# Patient Record
Sex: FEMALE | Race: ASIAN | NOT HISPANIC OR LATINO
[De-identification: names, ages, dates, MRNs, and addresses within clinical notes are randomized per-mention and may not be internally consistent; named-entity substitution may affect disease eponyms.]

---

## 2018-04-23 PROBLEM — Z00.00 ENCOUNTER FOR PREVENTIVE HEALTH EXAMINATION: Status: ACTIVE | Noted: 2018-04-23

## 2018-04-25 ENCOUNTER — APPOINTMENT (OUTPATIENT)
Dept: HUMAN REPRODUCTION | Facility: CLINIC | Age: 38
End: 2018-04-25

## 2018-05-21 ENCOUNTER — APPOINTMENT (OUTPATIENT)
Dept: ORTHOPEDIC SURGERY | Facility: CLINIC | Age: 38
End: 2018-05-21
Payer: MEDICAID

## 2018-05-21 VITALS
HEIGHT: 64 IN | WEIGHT: 160 LBS | HEART RATE: 82 BPM | BODY MASS INDEX: 27.31 KG/M2 | SYSTOLIC BLOOD PRESSURE: 106 MMHG | DIASTOLIC BLOOD PRESSURE: 71 MMHG

## 2018-05-21 DIAGNOSIS — Z78.9 OTHER SPECIFIED HEALTH STATUS: ICD-10-CM

## 2018-05-21 DIAGNOSIS — Z56.0 UNEMPLOYMENT, UNSPECIFIED: ICD-10-CM

## 2018-05-21 DIAGNOSIS — M54.31 SCIATICA, RIGHT SIDE: ICD-10-CM

## 2018-05-21 DIAGNOSIS — M25.561 PAIN IN RIGHT KNEE: ICD-10-CM

## 2018-05-21 DIAGNOSIS — Z82.61 FAMILY HISTORY OF ARTHRITIS: ICD-10-CM

## 2018-05-21 PROCEDURE — 73502 X-RAY EXAM HIP UNI 2-3 VIEWS: CPT | Mod: RT

## 2018-05-21 PROCEDURE — 99204 OFFICE O/P NEW MOD 45 MIN: CPT

## 2018-05-21 PROCEDURE — 72100 X-RAY EXAM L-S SPINE 2/3 VWS: CPT

## 2018-05-21 PROCEDURE — 73564 X-RAY EXAM KNEE 4 OR MORE: CPT | Mod: RT

## 2018-05-21 RX ORDER — OMEPRAZOLE 20 MG/1
20 TABLET, DELAYED RELEASE ORAL
Qty: 30 | Refills: 0 | Status: ACTIVE | COMMUNITY
Start: 2018-01-19

## 2018-05-21 SDOH — ECONOMIC STABILITY - INCOME SECURITY: UNEMPLOYMENT, UNSPECIFIED: Z56.0

## 2018-08-07 ENCOUNTER — APPOINTMENT (OUTPATIENT)
Dept: NEUROLOGY | Facility: CLINIC | Age: 38
End: 2018-08-07
Payer: MEDICAID

## 2018-08-07 VITALS
HEIGHT: 64 IN | DIASTOLIC BLOOD PRESSURE: 85 MMHG | HEART RATE: 79 BPM | WEIGHT: 157 LBS | BODY MASS INDEX: 26.8 KG/M2 | SYSTOLIC BLOOD PRESSURE: 123 MMHG

## 2018-08-07 DIAGNOSIS — G40.909 EPILEPSY, UNSPECIFIED, NOT INTRACTABLE, W/OUT STATUS EPILEPTICUS: ICD-10-CM

## 2018-08-07 PROCEDURE — 99205 OFFICE O/P NEW HI 60 MIN: CPT

## 2018-08-16 ENCOUNTER — RX CHANGE (OUTPATIENT)
Age: 38
End: 2018-08-16

## 2018-08-16 ENCOUNTER — RX RENEWAL (OUTPATIENT)
Age: 38
End: 2018-08-16

## 2018-11-08 ENCOUNTER — APPOINTMENT (OUTPATIENT)
Dept: NEUROLOGY | Facility: CLINIC | Age: 38
End: 2018-11-08
Payer: MEDICAID

## 2018-11-08 VITALS
WEIGHT: 160 LBS | BODY MASS INDEX: 27.31 KG/M2 | HEART RATE: 91 BPM | HEIGHT: 64 IN | SYSTOLIC BLOOD PRESSURE: 110 MMHG | DIASTOLIC BLOOD PRESSURE: 78 MMHG

## 2018-11-08 DIAGNOSIS — R93.89 ABNORMAL FINDINGS ON DIAGNOSTIC IMAGING OF OTHER SPECIFIED BODY STRUCTURES: ICD-10-CM

## 2018-11-08 PROCEDURE — 99214 OFFICE O/P EST MOD 30 MIN: CPT

## 2018-11-08 RX ORDER — LEVETIRACETAM 500 MG/1
500 TABLET, FILM COATED ORAL
Qty: 180 | Refills: 0 | Status: DISCONTINUED | COMMUNITY
Start: 2018-03-25 | End: 2018-11-08

## 2018-11-08 RX ORDER — LEVETIRACETAM 500 MG/1
500 TABLET, FILM COATED ORAL
Refills: 0 | Status: DISCONTINUED | COMMUNITY
End: 2018-11-08

## 2018-11-09 ENCOUNTER — OTHER (OUTPATIENT)
Age: 38
End: 2018-11-09

## 2018-11-20 ENCOUNTER — APPOINTMENT (OUTPATIENT)
Dept: MRI IMAGING | Facility: CLINIC | Age: 38
End: 2018-11-20

## 2018-11-21 ENCOUNTER — OTHER (OUTPATIENT)
Age: 38
End: 2018-11-21

## 2018-12-07 ENCOUNTER — OUTPATIENT (OUTPATIENT)
Dept: OUTPATIENT SERVICES | Facility: HOSPITAL | Age: 38
LOS: 1 days | End: 2018-12-07
Payer: MEDICAID

## 2018-12-07 ENCOUNTER — APPOINTMENT (OUTPATIENT)
Dept: MRI IMAGING | Facility: CLINIC | Age: 38
End: 2018-12-07
Payer: MEDICAID

## 2018-12-07 DIAGNOSIS — Z00.8 ENCOUNTER FOR OTHER GENERAL EXAMINATION: ICD-10-CM

## 2018-12-07 PROCEDURE — 70553 MRI BRAIN STEM W/O & W/DYE: CPT | Mod: 26

## 2018-12-07 PROCEDURE — A9585: CPT

## 2018-12-07 PROCEDURE — 70553 MRI BRAIN STEM W/O & W/DYE: CPT

## 2018-12-17 ENCOUNTER — OTHER (OUTPATIENT)
Age: 38
End: 2018-12-17

## 2018-12-24 ENCOUNTER — MOBILE ON CALL (OUTPATIENT)
Age: 38
End: 2018-12-24

## 2019-01-24 ENCOUNTER — APPOINTMENT (OUTPATIENT)
Dept: NEUROSURGERY | Facility: CLINIC | Age: 39
End: 2019-01-24
Payer: MEDICAID

## 2019-01-24 VITALS
TEMPERATURE: 97.8 F | HEIGHT: 64 IN | WEIGHT: 160 LBS | BODY MASS INDEX: 27.31 KG/M2 | RESPIRATION RATE: 16 BRPM | SYSTOLIC BLOOD PRESSURE: 115 MMHG | DIASTOLIC BLOOD PRESSURE: 80 MMHG | HEART RATE: 84 BPM | OXYGEN SATURATION: 95 %

## 2019-01-24 PROCEDURE — 99204 OFFICE O/P NEW MOD 45 MIN: CPT

## 2019-01-24 NOTE — PHYSICAL EXAM
[General Appearance - Alert] : alert [General Appearance - In No Acute Distress] : in no acute distress [Oriented To Time, Place, And Person] : oriented to person, place, and time [Person] : oriented to person [Place] : oriented to place [Time] : oriented to time [Cranial Nerves Optic (II)] : visual acuity intact bilaterally,  pupils equal round and reactive to light [Cranial Nerves Oculomotor (III)] : extraocular motion intact [Cranial Nerves Trigeminal (V)] : facial sensation intact symmetrically [Cranial Nerves Facial (VII)] : face symmetrical [Cranial Nerves Vestibulocochlear (VIII)] : hearing was intact bilaterally [Cranial Nerves Glossopharyngeal (IX)] : tongue and palate midline [Cranial Nerves Accessory (XI - Cranial And Spinal)] : head turning and shoulder shrug symmetric [Cranial Nerves Hypoglossal (XII)] : there was no tongue deviation with protrusion [Motor Strength] : muscle strength was normal in all four extremities [Involuntary Movements] : no involuntary movements were seen [] : no respiratory distress [Exaggerated Use Of Accessory Muscles For Inspiration] : no accessory muscle use [Abnormal Walk] : normal gait

## 2019-01-24 NOTE — REASON FOR VISIT
[New Patient Visit] : a new patient visit [Referred By: _________] : Patient was referred by ZARI [Spouse] : spouse

## 2019-02-19 ENCOUNTER — APPOINTMENT (OUTPATIENT)
Dept: NEUROLOGY | Facility: CLINIC | Age: 39
End: 2019-02-19
Payer: MEDICAID

## 2019-02-19 VITALS
HEART RATE: 80 BPM | HEIGHT: 64 IN | SYSTOLIC BLOOD PRESSURE: 130 MMHG | DIASTOLIC BLOOD PRESSURE: 86 MMHG | BODY MASS INDEX: 27.83 KG/M2 | WEIGHT: 163 LBS

## 2019-02-19 PROCEDURE — 99214 OFFICE O/P EST MOD 30 MIN: CPT

## 2019-02-19 NOTE — ASSESSMENT
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 Per pt, MRA and CT angiogram negative. Had 2 breakthrough aura: one while taking LEV 1gm BID + LCM 50mg qHS and the other while taking LEV 1gm BID. Currently back on low doses of LEV and LCM duotherapy. Explained to pt and  that we have the options of high dose LEV monotherapy, LCM monotherapy, and low dose duotherapy. Since pt was previous sz free on LEV monotherapy, we decided to try LEV 2gm BID monotherapy. All questions and concerns answered and addressed in detail. Patient and her  verbalized understanding and agreed to plan.\par \par - following titration schedule provided to pt; will taper off LCM 50mg BID, and increase LEV 500mg BID to 2gm BID\par wk 1: /1000mg, LCM 50mg BID\par wk 2: LEV 1000/1000mg BID, LCM 50mg BID\par wk 3: LEV 1000/2000mg, LCM 50mg BID\par wk 4: LEV 2000/2000mg, LCM 50mg BID\par wk 5-6: LEV 2000/2000mg, LCM 50mg qHS\par wk 7 and onward: LEV 2000/2000mg, STOP LCM\par \par - cont CLZ ODT 0.5mg 1-2 tablet prn aura\par - cont folic acid 4mg daily (pt would like to have more children)\par - f/u Dr. Santo for cav mal's\par - pt not driving\par - f/u in 3 months\par \par \par All relevant epilepsy AAN quality care measures were addressed and discussed with the patient and her .\par \par More than 50% of time spent counseling and educating patient and her  about epilepsy specific safety issues including AED side effects and interactions, alcohol consumption, sleep deprivation, risks and driving privileges associated with the [facility] Guidelines (pt not driving), how treatment may affect contraction and pregnancy, death related to seizures/SUDEP, seizure 1st aid and risks. Patient is educated on seizure precautions, including no driving, no operating heavy machinery, no unsupervised swimming or bathing, no unsupervised use of stove, no climbing heights, and no unsupervised use of sharp objects.

## 2019-02-19 NOTE — HISTORY OF PRESENT ILLNESS
[FreeTextEntry1] : LAST CLINIC VISIT: 18\par \par INTERIM HISTORY:\par Pt today is accompanied by her . At the last visit, plan was to cross titrate LEV 500mg BID and LCM 50mg BID to LEV 1gm BID. On , pt was on LEV 1gm BID and LCM 50mg qHS when she had a typical aura. Pt took CLZ ODT. On , pt was only on LEV 1gm BID and again had another aura, but this time was very mild. She again took CLZ ODT and called the on-call epilepsy fellow, who instructed pt to go back to LEV 500mg BID and LCM 50mg BID. \par \par Pt also saw Dr. 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 Offered cerebral angiography, but pt said she had it done in [place]. She will be bringing the imaging to Dr. Santo once she obtains a copy. Will f/u Dr. Santo and repeat image in a yr.\par \par CURRENT AEDs:\par LEV 500mg BID - started \par LCM 50mg BID - started 2018\par CLZ ODT 0.5mg prn - started 2018\par \par \par PRIOR EPILEPSY HISTORY:\par 18: This is a 38 year-old RH [place] female with no significant PMH who is self referred to establish care for epilepsy. Pt today is accompanied by her . Pt was previously followed by neurologist Dr. Chuck Davis. Pt's  provided most of history since pt speaks limited English.\par \par Onset of sz at age 25, during the 3rd month of pregnancy. Pt was in [facility] at the time. She was not started on AED, nor was she started on AED after her second sz at 9th month of pregnancy. After delivery, pt was started on CBZ. She was sz free on CBZ approximately from [id number]. Doctor in [facility] tapered her off of CBZ. When pt unfortunately sustained another sz, she was put back on CBZ. \par \par [place] the [place] around . At the time, she was planning for second pregnancy, so CBZ was switched to LEV 500mg BID. Next seizure occurred on 2016, likely triggered by stress. LEV was increased 1gm BID. When pt was sz free for several years on this dosing, LEV was decreased back to 500mg BID. Unfortunately, with lowered dose of LEV, sz recurred on 18, while pt was in [place]. She was admitted to [facility]. Head imaging found innumerable lesions throughout R supratentorial hemisphere without focal enhancement, edema, or mass effect. Ddx: cav mal, infectious, less likely Sturge Nicolas or petechial hemorrhage. MRA H/N and cerebral angiogram were unremarkable. Pt and  were not aware of this finding. At discharge, LCM 50mg BID was added to LEV 500mg BID.\par \par === 18 ===\par Pt today is accompanied by her . Clarified that she is really just only LEV 500mg BID (not 1gm BID) and LCM 50mg BID. She was sz free for several yrs on LEV 1gm BID. Sz recurred after LEV was lowered to 500mg BID. Has not had any further sz since last visit. CURRENT AEDs: LEV 500mg BID - started ; LCM 50mg BID - started 2018; CLZ ODT 0.5mg prn - started 2018.\par \par \par SEIZURE DESCRIPTION AND TYPE:\par Type #1\par Severity: focal sz, both aware and impaired (unclear if progresses to true BL tonic clonic sz)\par Onset: 26yo\par Quality & associated signs/symptoms: Prolonged aura of nausea +/- vomiting, geographic shapes (eg. stars, bubbles) in her L visual field -> loss of awareness, head turning/jerking toward the L, tonic-clonic activity in L arm and leg -> postictal confusion, L Miguel Ángel's. Pt developed postictal psychosis (auditory hallucination, paranoia) once after a sz. \par Duration: 20-40s\par Timin-8 lifetime; last one 18 (very mild aura)\par Modifying factors: triggered by stress, sleep deprivation\par Diurnal Variation: none\par \par EPILEPSY TYPE: focal epilepsy, structural etiology\par HISTORY OF TONIC-CLONIC SZ: unclear\par HISTORY OF STATUS EPILEPTICUS: no\par \par SEIZURE RISK FACTORS:\par Innumerable brain lesions in the R supratentorial hemisphere of unclear etiology. Patient was a product of normal pregnancy and delivery. No history of febrile seizure, TBI, CNS infection, or FH of seizures.\par \par PREVIOUS AEDs:\par CBZ - started 26yo, stopped ~ because pt was planning for pregnancy\par \par IMAGING: \par CTH 7/15/18 ([place], [place]): Gyriform calcifications in the R temporooccipital regions. Multiple subcentimeter hyperdense lesions in the R cerebral hemisphere. No significant edema or midline shift. \par \par MRI w/wo 18 ([place], [place]): Innumerable lesions throughout R supratentorial hemisphere without evidence of edema or mass effect. Diffuse mild enhancement throughout R supratentorial hemisphere. 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 Does not know how to drive. No E/T/D. College graduate. \par

## 2019-02-19 NOTE — PHYSICAL EXAM
[FreeTextEntry1] : GENERAL PHYSICAL EXAM:\par GEN: well appearing, well nourished, no distress, normal affect, cooperative \par HEENT:  NCAT, OP clear\par EYES: sclera white, conjunctiva clear, no nystagmus\par NECK: supple\par CV: nl S1/S2, RRR, no murmur     		\par PULM: CTAB, no wheezing\par GI: soft ABD, +BS, NT, ND\par EXT: peripheral pulse intact, no edema, no clubbing, no cyanosis\par MSK: muscle tone and strength normal\par SKIN: warm, dry, no rash or lesion on exposed skin \par \par NEUROLOGICAL EXAM:\par Mental Status\par Orientation: alert and oriented to person, place, time, and situation \par Language: clear and fluent, intact comprehension and repetition, intact naming and reading\par \par Cranial Nerves\par II: full visual fields intact \par III, IV, VI: PERRL, EOMI\par V, VII: facial sensation and movement intact and symmetric \par VIII: hearing intact \par IX, X: uvula midline, soft palate elevates normally \par XI: BL shoulder shrug intact \par XII: tongue midline\par \par Motor\par Shoulder abd: 5+ (R), 5+ (L)\par EF/EE: 5+ (R), 5+ (L)\par WF/WE: 5+ (R), 5+ (L)\par hand : 5+ (R), 5+ (L)\par HF/HE: 5+ (R), 5+ (L)\par KF/KE: 5+ (R), 5+ (L)\par DF/PF: 5+ (R), 5+ (L)                \par Tone and bulk are normal in upper and lower limbs\par No pronator drift\par \par Sensation\par Intact to light touch, vibration, proprioception, pinprick, and temperature in all 4 EXTs\par \par Reflex\par 2+ in BL biceps, triceps, brachioradiali, patella, ankle                                    \par Plantar responses downward bilaterally\par \par Coordination\par Normal FTN, HTS \par BL HARLEY intact\par \par Gait\par Normal stance, stride, and pivot turn\par Tandem walk intact\par Negative Romberg\par \par

## 2019-02-21 NOTE — HISTORY OF PRESENT ILLNESS
[de-identified] : Becky Delvalle is a 39yr old female right handed here today for a new patient visit. She has a past medical history of seizures. Most recently she has had a seizure in August 2018. She has about 1 seizure per year. She is followed by neurologist Dr. Betancourt for seizure management and is currently on keppra 1g twice a day. She had a brain MRI done which was abnormal and Dr. Betancourt referred her to us for further management. Today she said she feels well. She denies any motor/speech/sensory/visual abnormalities.  \par \par PCP: Dr. Gayathri Delvalle

## 2019-02-21 NOTE — ASSESSMENT
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 \par MRI brain with and without contrast in 1yr then follow up in the office after\par Discussed potential for diagnostic cerebral angiography. (pt states she had angiogram done in [place] no images or reports with them today unsure of doctors name)\par Cavernous malformations most likely cause of her seizures \par Follow up with Dr. Betancourt for seizure management

## 2019-02-21 NOTE — RESULTS/DATA
[FreeTextEntry1] : EXAM: MR BRAIN WAW IC \par \par \par PROCEDURE DATE: 12/07/2018 \par \par \par \par INTERPRETATION: CLINICAL INFORMATION: Epilepsy protocol. [place] female \par presented with new onset seizures at age 25 during pregnancy. Started on \par anti-epileptics after completion of pregnancy and was eventually tapered off \par and restarted on antiepileptics with seizure recurrence. Per history, \par imaging at outside hospital found innumerable lesions throughout R \par supratentorial hemisphere without focal enhancement, edema, or mass effect. \par \par TECHNIQUE: MRI of the brain was performed with and without contrast. \par Sagittal and axial T1, axial T2, FLAIR, SWI, diffusion-weighted images and \par an ADC map were obtained. \par \par 7.5 cc of Gadavist was injected, with 0 cc discarded. \par \par COMPARISON: None \par \par FINDINGS: \par There is a multitude of centrally T2 hyperintense, peripherally T2 \par hypointense signal mass throughout the right cerebral hemisphere. There are \par associated multiple serpiginous vessels concerning for cavernomatosis. There \par is no associated mass effect or surrounding parenchymal edema for any of \par these lesions to suggest recent hemorrhage. \par \par There is right hemispheric volume loss best appreciated on coronal T2 \par imaging, seen as asymmetric prominence of right hemispheric extra-axial \par spaces and sulci. \par \par There is an additional small focus of hypoattenuation on T2-weighted imaging \par in the left middle cranial fossa consistent with a small osteoma versus tiny \par calcified meningioma. \par \par There is no intraparenchymal hematoma, mass effect or midline shift. \par \par No abnormal extra-axial fluid collections are present. Major flow-voids at \par the base of the brain follow expected course and contour. \par \par The calvarium is intact. The visualized intraorbital compartments, paranasal \par sinuses and tympanomastoid cavities appear free of acute disease. 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 \par Lesions are unusually hemispheric as no lesions are seen within the left \par cerebral hemisphere. While findings may represent familial versus sporadic \par cavernomatosis, radiation induced cavernomatosis in the appropriate clinical \par setting may be considered. Other differential diagnoses of vascular \par malformations are not excluded. Findings are not consistent with \par neurosarcoidosis or lymphoproliferative abnormality. \par \par \par \par \par \par \par KAYCEE BERRIOS M.D., RADIOLOGY RESIDENT \par This document has been electronically signed. \par DAVID SANCHEZ M.D., ATTENDING RADIOLOGIST \par This document has been electronically signed. Dec 7 2018 4:38PM \par \par \par

## 2019-02-21 NOTE — CONSULT LETTER
[Dear  ___] : Dear  [unfilled], [Consult Letter:] : I had the pleasure of evaluating your patient, [unfilled]. [DrSushma  ___] : Dr. HAMEED [Consult Closing:] : Thank you very much for allowing me to participate in the care of this patient.  If you have any questions, please do not hesitate to contact me. [Sincerely,] : Sincerely, [FreeTextEntry1] : As you know she is a 39yr old right handed female with a past medical history significant for seizures.  Most recently she has had a seizure in August 2018. She has about 1 seizure per year. Her current antiseizure medication is keppra 1g twice a day. Recently, she had a brain MRI done which was abnormal.  After reviewing the MRI brain it is clear she has multiple cavernous malformations on the right hemisphere with developmental venous anomalies.  There is no acute hemorrhage seen.  The patient states she had a cerebral angiogram done in [place] in the past.  Those images and reports are not available for my review today. At this time I recommend repeat MRI brain with and without contrast in one year, mail us the cerebral angiogram, and follow up with neurology for continued seizure management.   [FreeTextEntry3] : Quirino Santo MD\par Professor of Neurological Surgery and Radiology\par  Department of Neurosurgery\par Director Cerebrovascular Surgery\par [facility] [employer] [facility]\par

## 2019-02-25 ENCOUNTER — RX RENEWAL (OUTPATIENT)
Age: 39
End: 2019-02-25

## 2019-04-03 ENCOUNTER — RX RENEWAL (OUTPATIENT)
Age: 39
End: 2019-04-03

## 2019-04-04 ENCOUNTER — OTHER (OUTPATIENT)
Age: 39
End: 2019-04-04

## 2019-04-04 ENCOUNTER — RX RENEWAL (OUTPATIENT)
Age: 39
End: 2019-04-04

## 2019-05-05 ENCOUNTER — RX RENEWAL (OUTPATIENT)
Age: 39
End: 2019-05-05

## 2019-06-03 ENCOUNTER — APPOINTMENT (OUTPATIENT)
Dept: NEUROLOGY | Facility: CLINIC | Age: 39
End: 2019-06-03
Payer: MEDICAID

## 2019-06-03 VITALS
HEART RATE: 84 BPM | WEIGHT: 165 LBS | DIASTOLIC BLOOD PRESSURE: 81 MMHG | SYSTOLIC BLOOD PRESSURE: 116 MMHG | HEIGHT: 64 IN | BODY MASS INDEX: 28.17 KG/M2

## 2019-06-03 PROCEDURE — 99214 OFFICE O/P EST MOD 30 MIN: CPT

## 2019-06-03 NOTE — HISTORY OF PRESENT ILLNESS
[FreeTextEntry1] : LAST CLINIC VISIT: 2/9/19\par \par INTERIM HISTORY:\par Pt today is accompanied by her . At the last visit, planned to taper off LCM 50mg BID and increase LEV from 500mg BID to 2000mg BID. Three to four days after coming off of LCM, pt had a breakthrough seizure, needed CZP ODT 1mg x2. Pt now on LCM 50mg BID and LEV 1000mg BID. No further sz on this duo-therapy. \par \par CURRENT AEDs:\par LEV 1000mg BID - started 2015\par LCM 50mg BID - started 7/2018\par CLZ ODT 1mg tabs prn - started 8/2018\par \par \par PRIOR EPILEPSY HISTORY:\par 8/7/18: This is a 38 year-old RH [place]ian female with no significant PMH who is self referred to establish care for epilepsy. Pt today is accompanied by her . Pt was previously followed by neurologist Dr. Chuck Davis. Pt's  provided most of history since pt speaks limited English.\par \par Onset of sz at age 25, during the 3rd month of pregnancy. Pt was in [facility] at the time. She was not started on AED, nor was she started on AED after her second sz at 9th month of pregnancy. After delivery, pt was started on CBZ. She was sz free on CBZ approximately from [id number]. Doctor in [facility] tapered her off of CBZ. When pt unfortunately sustained another sz, she was put back on CBZ. \par \par [place] the [place] around [place]. At the time, she was planning for second pregnancy, so CBZ was switched to LEV 500mg BID. Next seizure occurred on 8/2016, likely triggered by stress. LEV was increased 1gm BID. When pt was sz free for several years on this dosing, LEV was decreased back to 500mg BID. Unfortunately, with lowered dose of LEV, sz recurred on 7/14/18, while pt was in [place]. She was admitted to [facility]. Head imaging found innumerable lesions throughout R supratentorial hemisphere without focal enhancement, edema, or mass effect. Ddx: cav mal, infectious, less likely Sturge Nicolas or petechial hemorrhage. MRA H/N and cerebral angiogram were unremarkable. Pt and  were not aware of this finding. At discharge, LCM 50mg BID was added to LEV 500mg BID.\par \par === 11/8/18 ===\par Pt today is accompanied by her . Clarified that she is really just only LEV 500mg BID (not 1gm BID) and LCM 50mg BID. She was sz free for several yrs on LEV 1gm BID. Sz recurred after LEV was lowered to 500mg BID. Has not had any further sz since last visit. CURRENT AEDs: LEV 500mg BID - started 2015; LCM 50mg BID - started 7/2018; CLZ ODT 0.5mg prn - started 8/2018.\par \par === 2/19/19 ===\par Pt today is accompanied by her . At the last visit, plan was to cross titrate LEV 500mg BID and LCM 50mg BID to LEV 1gm BID. On 11/13, pt was on LEV 1gm BID and LCM 50mg qHS when she had a typical aura. Pt took CLZ ODT. On 12/24, pt was only on LEV 1gm BID and again had another aura, but this time was very mild. She again took CLZ ODT and called the on-call epilepsy fellow, who instructed pt to go back to LEV 500mg BID and LCM 50mg BID. \par \par Pt also saw Dr. 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 Offered cerebral angiography, but pt said she had it done in [place]. She will be bringing the imaging to Dr. Santo once she obtains a copy. Will f/u Dr. Santo and repeat image in a yr. CURRENT AEDs: LEV 500mg BID - started 2015; LCM 50mg BID - started 7/2018; CLZ ODT 0.5mg prn - started 8/2018.\par \par \par SEIZURE DESCRIPTION AND TYPE:\par Type #1\par Severity: focal sz, both aware and impaired (unclear if progresses to true BL tonic clonic sz)\par Onset: 26yo\par Quality & associated signs/symptoms: Prolonged aura of nausea +/- vomiting, geographic shapes (eg. stars, bubbles) in her L visual field -> loss of awareness, head turning/jerking toward the L, tonic-clonic activity in L arm and leg -> postictal confusion, L Miguel Ángel's. Pt developed postictal psychosis (auditory hallucination, paranoia) once after a sz. \par Duration: 20-40s\par Timing: ~10 lifetime; last one 4/2019 \par Modifying factors: triggered by stress, sleep deprivation\par Diurnal Variation: none\par \par EPILEPSY TYPE: focal epilepsy, structural etiology\par HISTORY OF TONIC-CLONIC SZ: unclear\par HISTORY OF STATUS EPILEPTICUS: no\par \par SEIZURE RISK FACTORS:\par Innumerable R hemispheric brain lesions thought to be cav mal's and DVAs w/o acute bleeding. Patient was a product of normal pregnancy and delivery. 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 Lesions are unusually hemispheric as no lesions are seen within the left cerebral hemisphere. While findings may represent familial versus sporadic cavernomatosis, radiation induced cavernomatosis in the appropriate clinical setting may be considered. Other differential diagnoses of vascular malformations are not excluded. Findings are not consistent with neurosarcoidosis or lymphoproliferative abnormality. \par \par CTH 7/15/18 ([place], [place]): Gyriform calcifications in the R temporooccipital regions. Multiple subcentimeter hyperdense lesions in the R cerebral hemisphere. No significant edema or midline shift. \par \par MRI w/wo 7/14/18 ([place], [place]): Innumerable lesions throughout R supratentorial hemisphere without evidence of edema or mass effect. Diffuse mild enhancement throughout R supratentorial hemisphere. Ddx: cav mal, infectious, less likely Sturge Nicolas or petechial hemorrhage.\par \par MRA H/N w/wo 7/15/18 ([place], [place]): normal\par \par Cerebral angiogram 7/17/18 ([place], [place]): no aneurysm or AVM \par \par NEUROPHYSIOLOGY:\par cvEEG 7/16 - 7/17/18 ([place], [place]): normal awake and asleep, no IED\par \par NEUROPSYCHOLOGY: \par none

## 2019-06-03 NOTE — ASSESSMENT
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 Per pt, MRA and CT angiogram negative. \par \par Had breakthrough aura while tapering off LCM. Currently back on LEV and LCM duo-therapy. No sz. Gave pt and  choices of either staying on duo-therapy (LCM 50mg BID, LEV 1gm BID) vs mono-therapy (LCM 100mg BID). Educated pt on reduced effect of teratogenicity on one AED compared to two. Pt prefers to stay on duo-therapy because she is afraid of further seizures. All questions and concerns answered and addressed in detail to pt and 's complete satisfaction. Patient and her  verbalized understanding and agreed to plan.\par \par - continue LCM 50mg BID, LEV 1gm BID (if AED escalation needed in future, increase LCM to 100mg BID)\par - cont CLZ ODT 1mg prn aura\par - cont folic acid 4mg daily (actively trying to conceive)\par - f/u Dr. Santo for cav mal's\par - pt not driving\par - f/u in 3 months\par \par \par All relevant epilepsy AAN quality care measures were addressed and discussed with the patient and her .\par \par More than 50% of time spent counseling and educating patient and her  about epilepsy specific safety issues including AED side effects and interactions, alcohol consumption, sleep deprivation, risks and driving privileges associated with the [place] Guidelines (pt not driving), how treatment may affect contraction and pregnancy, death related to seizures/SUDEP, seizure 1st aid and risks. Patient and  are educated on seizure precautions, including no driving, no operating heavy machinery, no unsupervised swimming or bathing, no unsupervised use of stove, no climbing heights, and no unsupervised use of sharp objects.

## 2019-08-13 ENCOUNTER — APPOINTMENT (OUTPATIENT)
Dept: DERMATOLOGY | Facility: CLINIC | Age: 39
End: 2019-08-13
Payer: MEDICAID

## 2019-08-13 VITALS — HEIGHT: 64 IN | BODY MASS INDEX: 28.51 KG/M2 | WEIGHT: 167 LBS

## 2019-08-13 DIAGNOSIS — Z91.89 OTHER SPECIFIED PERSONAL RISK FACTORS, NOT ELSEWHERE CLASSIFIED: ICD-10-CM

## 2019-08-13 DIAGNOSIS — L81.1 CHLOASMA: ICD-10-CM

## 2019-08-13 PROCEDURE — 99203 OFFICE O/P NEW LOW 30 MIN: CPT

## 2019-09-09 ENCOUNTER — APPOINTMENT (OUTPATIENT)
Dept: NEUROLOGY | Facility: CLINIC | Age: 39
End: 2019-09-09

## 2019-09-10 ENCOUNTER — APPOINTMENT (OUTPATIENT)
Dept: NEUROLOGY | Facility: CLINIC | Age: 39
End: 2019-09-10

## 2019-09-11 ENCOUNTER — APPOINTMENT (OUTPATIENT)
Dept: NEUROLOGY | Facility: CLINIC | Age: 39
End: 2019-09-11
Payer: MEDICAID

## 2019-09-11 PROCEDURE — 99214 OFFICE O/P EST MOD 30 MIN: CPT

## 2019-09-11 RX ORDER — LEVETIRACETAM 1000 MG/1
1000 TABLET, FILM COATED ORAL TWICE DAILY
Qty: 180 | Refills: 3 | Status: COMPLETED | COMMUNITY
Start: 2018-11-08 | End: 2019-09-11

## 2019-09-11 NOTE — ASSESSMENT
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 Per pt, MRA and CT angiogram negative. \par \par Still having seizures on LCM and LEV. Will increase LCM and switch LEV IR to ER. All questions and concerns answered and addressed in detail to pt and 's complete satisfaction. Patient and her  verbalized understanding and agreed to plan.\par \par - increaseLCM 50mg BID to 100mg BID: 50/100mg x1wk -> 100mg BID\par - switch LEV IR 1gm BID to ER 1gm BID\par - cont CLZ ODT 1mg prn aura\par - cont folic acid 4mg daily (actively trying to conceive)\par - f/u Dr. Santo for cav mal's\par - pt not driving\par - f/u in 1 month\par \par \par All relevant epilepsy AAN quality care measures were addressed and discussed with the patient and her .\par \par More than 50% of time spent counseling and educating patient and her  about epilepsy specific safety issues including AED side effects and interactions, alcohol consumption, sleep deprivation, risks and driving privileges associated with the [place] Guidelines (pt not driving), how treatment may affect contraction and pregnancy, death related to seizures/SUDEP, seizure 1st aid and risks. Patient and  are educated on seizure precautions, including no driving, no operating heavy machinery, no unsupervised swimming or bathing, no unsupervised use of stove, no climbing heights, and no unsupervised use of sharp objects.

## 2019-09-11 NOTE — HISTORY OF PRESENT ILLNESS
[FreeTextEntry1] : LAST CLINIC VISIT: 2/9/19\par \par INTERIM HISTORY:\par Since last visit, pt has had 4 seizures that started with buzzing noise, visual aura in L eye, nausea and tachycardia. All of which were aborted with CZP ODT.\par \par CURRENT AEDs:\par LEV 1000mg BID - started 2015\par LCM 50mg BID - started 7/2018\par CLZ ODT 1mg tabs prn - started 8/2018\par \par \par PRIOR EPILEPSY HISTORY:\par 8/7/18: This is a 38 year-old RH Ukrainian female with no significant PMH who is self referred to establish care for epilepsy. Pt today is accompanied by her . Pt was previously followed by neurologist Dr. Chuck Davis. Pt's  provided most of history since pt speaks limited English.\par \par Onset of sz at age 25, during the 3rd month of pregnancy. Pt was in [facility] at the time. She was not started on AED, nor was she started on AED after her second sz at 9th month of pregnancy. After delivery, pt was started on CBZ. She was sz free on CBZ approximately from [id number]. Doctor in [facility] tapered her off of CBZ. When pt unfortunately sustained another sz, she was put back on CBZ. \par \par [place] to the [place] around [place]. At the time, she was planning for second pregnancy, so CBZ was switched to LEV 500mg BID. Next seizure occurred on 8/2016, likely triggered by stress. LEV was increased 1gm BID. When pt was sz free for several years on this dosing, LEV was decreased back to 500mg BID. Unfortunately, with lowered dose of LEV, sz recurred on 7/14/18, while pt was in [place]. She was admitted to [facility]. Head imaging found innumerable lesions throughout R supratentorial hemisphere without focal enhancement, edema, or mass effect. Ddx: cav mal, infectious, less likely Sturge Nicolas or petechial hemorrhage. MRA H/N and cerebral angiogram were unremarkable. Pt and  were not aware of this finding. At discharge, LCM 50mg BID was added to LEV 500mg BID.\par \par === 11/8/18 ===\par Pt today is accompanied by her . Clarified that she is really just only LEV 500mg BID (not 1gm BID) and LCM 50mg BID. She was sz free for several yrs on LEV 1gm BID. Sz recurred after LEV was lowered to 500mg BID. Has not had any further sz since last visit. CURRENT AEDs: LEV 500mg BID - started 2015; LCM 50mg BID - started 7/2018; CLZ ODT 0.5mg prn - started 8/2018.\par \par === 2/19/19 ===\par Pt today is accompanied by her . At the last visit, plan was to cross titrate LEV 500mg BID and LCM 50mg BID to LEV 1gm BID. On 11/13, pt was on LEV 1gm BID and LCM 50mg qHS when she had a typical aura. Pt took CLZ ODT. On 12/24, pt was only on LEV 1gm BID and again had another aura, but this time was very mild. She again took CLZ ODT and called the on-call epilepsy fellow, who instructed pt to go back to LEV 500mg BID and LCM 50mg BID. \par \par Pt also saw Dr. 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 Offered cerebral angiography, but pt said she had it done in [place]. She will be bringing the imaging to Dr. Santo once she obtains a copy. Will f/u Dr. Santo and repeat image in a yr. CURRENT AEDs: LEV 500mg BID - started 2015; LCM 50mg BID - started 7/2018; CLZ ODT 0.5mg prn - started 8/2018.\par \par === 6/3/19 ===\par Pt today is accompanied by her . At the last visit, planned to taper off LCM 50mg BID and increase LEV from 500mg BID to 2000mg BID. Three to four days after coming off of LCM, pt had a breakthrough seizure, needed CZP ODT 1mg x2. Pt now on LCM 50mg BID and LEV 1000mg BID. No further sz on this duo-therapy. CURRENT AEDs: LEV 1000mg BID - started 2015, LCM 50mg BID - started 7/2018, CLZ ODT 1mg tabs prn - started 8/2018.\par \par \par SEIZURE DESCRIPTION AND TYPE:\par Type #1\par Severity: focal sz, both aware and impaired (unclear if progresses to true BL tonic clonic sz)\par Onset: 26yo\par Quality & associated signs/symptoms: Prolonged aura of nausea +/- vomiting, geographic shapes (eg. stars, bubbles) in her L visual field -> loss of awareness, head turning/jerking toward the L, tonic-clonic activity in L arm and leg -> postictal confusion, L Miguel Ángel's. Pt developed postictal psychosis (auditory hallucination, paranoia) once after a sz. \par Duration: 20-40s\par Timing: rare before 7/2018, frequency increased to now up to once every 1-2 months\par Modifying factors: triggered by stress, sleep deprivation\par Diurnal Variation: none\par \par EPILEPSY TYPE: focal epilepsy, structural etiology\par HISTORY OF TONIC-CLONIC SZ: unclear\par HISTORY OF STATUS EPILEPTICUS: no\par \par SEIZURE RISK FACTORS:\par Innumerable R hemispheric brain lesions thought to be cav mal's and DVAs w/o acute bleeding. Patient was a product of normal pregnancy and delivery. 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 Lesions are unusually hemispheric as no lesions are seen within the left cerebral hemisphere. While findings may represent familial versus sporadic cavernomatosis, radiation induced cavernomatosis in the appropriate clinical setting may be considered. Other differential diagnoses of vascular malformations are not excluded. Findings are not consistent with neurosarcoidosis or lymphoproliferative abnormality. \par \par CTH 7/15/18 ([place], [place]): Gyriform calcifications in the R temporooccipital regions. Multiple subcentimeter hyperdense lesions in the R cerebral hemisphere. No significant edema or midline shift. \par \par MRI w/wo 7/14/18 ([place], [place]): Innumerable lesions throughout R supratentorial hemisphere without evidence of edema or mass effect. Diffuse mild enhancement throughout R supratentorial hemisphere. Ddx: cav mal, infectious, less likely Sturge Nicolas or petechial hemorrhage.\par \par MRA H/N w/wo 7/15/18 ([place], [place]): normal\par \par Cerebral angiogram 7/17/18 ([place], [place]): no aneurysm or AVM \par \par NEUROPHYSIOLOGY:\par cvEEG 7/16 - 7/17/18 ([place], [place]): normal awake and asleep, no IED\par \par NEUROPSYCHOLOGY: \par none

## 2019-09-19 ENCOUNTER — RX RENEWAL (OUTPATIENT)
Age: 39
End: 2019-09-19

## 2019-09-30 ENCOUNTER — APPOINTMENT (OUTPATIENT)
Dept: NEUROSURGERY | Facility: CLINIC | Age: 39
End: 2019-09-30
Payer: MEDICAID

## 2019-09-30 PROCEDURE — 99213 OFFICE O/P EST LOW 20 MIN: CPT

## 2019-09-30 NOTE — REASON FOR VISIT
[Follow-Up: _____] : a [unfilled] follow-up visit [Spouse] : spouse [FreeTextEntry1] : Becky Delvalle is a 39yr old female right handed here today for a follow up visit. We are monitoring cavernous malformations that she has in the right hemisphere. She did not get any new imaging. She brought the angiogram disc and report that was done in [place] for us to review. In the past couple of months she has had two seizures. Her seizure are being managed by Dr. Betancourt. Today she feels well, denies any headache, motor, sensory, speech, visual abnormalities

## 2019-09-30 NOTE — PHYSICAL EXAM
[General Appearance - In No Acute Distress] : in no acute distress [Person] : oriented to person [General Appearance - Alert] : alert [Place] : oriented to place [Time] : oriented to time [Motor Strength] : muscle strength was normal in all four extremities [Involuntary Movements] : no involuntary movements were seen [] : no respiratory distress [Abnormal Walk] : normal gait

## 2019-09-30 NOTE — ASSESSMENT
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  The reports read as normal cerebral angiogram without any evidence of aneurysms or arterial venous malformations. Unfortunately the discs only show the images of the left vertebral artery. The other vessels imaged are not on these discs. \par MRI brain with and without contrast in December 2019 then follow up in our office after. \par Follow up with Dr. Betancourt in regards to the seizure management. \par Advised them to bring us new cerebral angiogram discs if they can.  \par

## 2019-11-06 ENCOUNTER — APPOINTMENT (OUTPATIENT)
Dept: NEUROLOGY | Facility: CLINIC | Age: 39
End: 2019-11-06
Payer: MEDICAID

## 2019-11-06 VITALS
SYSTOLIC BLOOD PRESSURE: 118 MMHG | BODY MASS INDEX: 36.57 KG/M2 | DIASTOLIC BLOOD PRESSURE: 82 MMHG | HEIGHT: 55 IN | WEIGHT: 158 LBS

## 2019-11-06 PROCEDURE — 99214 OFFICE O/P EST MOD 30 MIN: CPT

## 2019-11-06 RX ORDER — LEVETIRACETAM 1000 MG/1
1000 TABLET, FILM COATED ORAL TWICE DAILY
Qty: 180 | Refills: 3 | Status: COMPLETED | COMMUNITY
Start: 2019-09-11 | End: 2019-11-06

## 2019-11-06 NOTE — ASSESSMENT
[FreeTextEntry1] : JERRY BUTCHER is a 39 year-old RH female w/ numerous R hemisphere cav mal's (sees Dr. Santo) and DVAs who presented for followup for focal epilepsy of structural etiology, characterized by focal aware and impaired awareness sz's (unclear if progresses to true BL tonic clonic). Per pt, MRA and CT angiogram negative. \par \par Sz free on LEV and LCM duo-therapy. All questions and concerns answered and addressed in detail to pt and 's complete satisfaction. Patient and her  verbalized understanding and agreed to plan.\par \par - continue LCM 100mg BID\par - switch LEV IR 1gm BID to ER 1gm BID\par - cont CLZ ODT 1mg prn aura\par - cont folic acid 4mg daily (actively trying to conceive)\par - CBC, CMP, LEV and LCM trough levels\par - f/u Dr. Sanot for cav mal's\par - pt not driving\par - f/u in 2 months\par \par \par All relevant epilepsy AAN quality care measures were addressed and discussed with the patient and her .\par \par More than 50% of time spent counseling and educating patient and her  about epilepsy specific safety issues including AED side effects and interactions, alcohol consumption, sleep deprivation, risks and driving privileges associated with the [place] Guidelines (pt not driving), how treatment may affect contraction and pregnancy, death related to seizures/SUDEP, seizure 1st aid and risks. Patient and  are educated on seizure precautions, including no driving, no operating heavy machinery, no unsupervised swimming or bathing, no unsupervised use of stove, no climbing heights, and no unsupervised use of sharp objects.

## 2019-11-06 NOTE — HISTORY OF PRESENT ILLNESS
[FreeTextEntry1] : LAST CLINIC VISIT: 9/11/19\par \par INTERIM HISTORY:\par Pt today is accompanied by her . At the last visit, increased LCM from 50mg BID to 100mg BID, which pt did. She was asked to change LEV IR 1gm BID to ER 1gm BID, which pt did not do. Hasn't had any sz since last visit. Saw Dr. Santo; awaiting MRI brain w/wo.\par \par CURRENT AEDs:\par LEV IR 1000mg BID - started 2015\par LCM 100mg BID - started 7/2018\par CLZ ODT 1mg tabs prn - started 8/2018\par \par \par PRIOR EPILEPSY HISTORY:\par 8/7/18: This is a 38 year-old RH Polish female with no significant PMH who is self referred to establish care for epilepsy. Pt today is accompanied by her . Pt was previously followed by neurologist Dr. Chuck Davis. Pt's  provided most of history since pt speaks limited English.\par \par Onset of sz at age 25, during the 3rd month of pregnancy. Pt was in [facility] at the time. She was not started on AED, nor was she started on AED after her second sz at 9th month of pregnancy. After delivery, pt was started on CBZ. She was sz free on CBZ approximately from [id number]. Doctor in [facility] tapered her off of CBZ. When pt unfortunately sustained another sz, she was put back on CBZ. \par \par [place] around [place]. At the time, she was planning for second pregnancy, so CBZ was switched to LEV 500mg BID. Next seizure occurred on 8/2016, likely triggered by stress. LEV was increased 1gm BID. When pt was sz free for several years on this dosing, LEV was decreased back to 500mg BID. Unfortunately, with lowered dose of LEV, sz recurred on 7/14/18, while pt was in [place]. She was admitted to [facility]. Head imaging found innumerable lesions throughout R supratentorial hemisphere without focal enhancement, edema, or mass effect. Ddx: cav mal, infectious, less likely Sturge Nicolas or petechial hemorrhage. MRA H/N and cerebral angiogram were unremarkable. Pt and  were not aware of this finding. At discharge, LCM 50mg BID was added to LEV 500mg BID.\par \par === 11/8/18 ===\par Pt today is accompanied by her . Clarified that she is really just only LEV 500mg BID (not 1gm BID) and LCM 50mg BID. She was sz free for several yrs on LEV 1gm BID. Sz recurred after LEV was lowered to 500mg BID. Has not had any further sz since last visit. CURRENT AEDs: LEV 500mg BID - started 2015; LCM 50mg BID - started 7/2018; CLZ ODT 0.5mg prn - started 8/2018.\par \par === 2/19/19 ===\par Pt today is accompanied by her . At the last visit, plan was to cross titrate LEV 500mg BID and LCM 50mg BID to LEV 1gm BID. On 11/13, pt was on LEV 1gm BID and LCM 50mg qHS when she had a typical aura. Pt took CLZ ODT. On 12/24, pt was only on LEV 1gm BID and again had another aura, but this time was very mild. She again took CLZ ODT and called the on-call epilepsy fellow, who instructed pt to go back to LEV 500mg BID and LCM 50mg BID. \par \par Pt also saw Dr. 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 Offered cerebral angiography, but pt said she had it done in [place]. She will be bringing the imaging to Dr. Santo once she obtains a copy. Will f/u Dr. Santo and repeat image in a yr. CURRENT AEDs: LEV 500mg BID - started 2015; LCM 50mg BID - started 7/2018; CLZ ODT 0.5mg prn - started 8/2018.\par \par === 6/3/19 ===\par Pt today is accompanied by her . At the last visit, planned to taper off LCM 50mg BID and increase LEV from 500mg BID to 2000mg BID. Three to four days after coming off of LCM, pt had a breakthrough seizure, needed CZP ODT 1mg x2. Pt now on LCM 50mg BID and LEV 1000mg BID. No further sz on this duo-therapy. CURRENT AEDs: LEV 1000mg BID - started 2015, LCM 50mg BID - started 7/2018, CLZ ODT 1mg tabs prn - started 8/2018.\par \par === 9/11/19 ===\par Since last visit, pt has had 4 seizures that started with buzzing noise, visual aura in L eye, nausea and tachycardia. All of which were aborted with CZP ODT. CURRENT AEDs: LEV 1000mg BID - started 2015\par LCM 50mg BID - started 7/2018; CLZ ODT 1mg tabs prn - started 8/2018.\par \par \par SEIZURE DESCRIPTION AND TYPE:\par Type #1\par Severity: focal sz, both aware and impaired (unclear if progresses to true BL tonic clonic sz)\par Onset: 26yo\par Quality & associated signs/symptoms: Prolonged aura of nausea +/- vomiting, geographic shapes (eg. stars, bubbles) in her L visual field -> loss of awareness, head turning/jerking toward the L, tonic-clonic activity in L arm and leg -> postictal confusion, L Miguel Ángel's. Pt developed postictal psychosis (auditory hallucination, paranoia) once after a sz. \par Duration: 20-40s\par Timing: rare before 7/2018, frequency increased to now up to once every 1-2 months; none since 9/2019 while on LEV 1gm BID and increased LCM to 100mg BID\par Modifying factors: triggered by stress, sleep deprivation\par Diurnal Variation: none\par \par EPILEPSY TYPE: focal epilepsy, structural etiology\par HISTORY OF TONIC-CLONIC SZ: unclear\par HISTORY OF STATUS EPILEPTICUS: no\par \par SEIZURE RISK FACTORS:\par Innumerable R hemispheric brain lesions thought to be cav mal's and DVAs w/o acute bleeding. Patient was a product of normal pregnancy and delivery. No history of febrile seizure, TBI, CNS infection, or FH of seizures.\par \par PREVIOUS AEDs:\par CBZ - started 26yo, stopped ~2015 because pt was planning for pregnancy\par \par IMAGING: \par MRI w/wo 12/8/18 (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 Lesions are unusually hemispheric as no lesions are seen within the left cerebral hemisphere. While findings may represent familial versus sporadic cavernomatosis, radiation induced cavernomatosis in the appropriate clinical setting may be considered. Other differential diagnoses of vascular malformations are not excluded. Findings are not consistent with neurosarcoidosis or lymphoproliferative abnormality. \par \par CTH 7/15/18 ([place], [place]): Gyriform calcifications in the R temporooccipital regions. Multiple subcentimeter hyperdense lesions in the R cerebral hemisphere. No significant edema or midline shift. \par \par MRI w/wo 7/14/18 ([place], [place]): Innumerable lesions throughout R supratentorial hemisphere without evidence of edema or mass effect. Diffuse mild enhancement throughout R supratentorial hemisphere. Ddx: cav mal, infectious, less likely Sturge Nicolas or petechial hemorrhage.\par \par MRA H/N w/wo 7/15/18 ([facility], [place]): normal\par \par Cerebral angiogram 7/17/18 ([place], [place]): no aneurysm or AVM \par \par NEUROPHYSIOLOGY:\par cvEEG 7/16 - 7/17/18 ([place], [place]): normal awake and asleep, no IED\par \par NEUROPSYCHOLOGY: \par none

## 2019-11-22 ENCOUNTER — APPOINTMENT (OUTPATIENT)
Dept: DERMATOLOGY | Facility: CLINIC | Age: 39
End: 2019-11-22

## 2019-11-27 ENCOUNTER — APPOINTMENT (OUTPATIENT)
Dept: MRI IMAGING | Facility: CLINIC | Age: 39
End: 2019-11-27
Payer: MEDICAID

## 2019-11-27 ENCOUNTER — OUTPATIENT (OUTPATIENT)
Dept: OUTPATIENT SERVICES | Facility: HOSPITAL | Age: 39
LOS: 1 days | End: 2019-11-27
Payer: MEDICAID

## 2019-11-27 DIAGNOSIS — Q28.3 OTHER MALFORMATIONS OF CEREBRAL VESSELS: ICD-10-CM

## 2019-11-27 PROCEDURE — 70553 MRI BRAIN STEM W/O & W/DYE: CPT | Mod: 26

## 2019-11-27 PROCEDURE — 70553 MRI BRAIN STEM W/O & W/DYE: CPT

## 2019-11-27 PROCEDURE — A9585: CPT

## 2019-12-18 ENCOUNTER — OFFICE (OUTPATIENT)
Dept: URBAN - METROPOLITAN AREA CLINIC 6 | Facility: CLINIC | Age: 39
Setting detail: OPHTHALMOLOGY
End: 2019-12-18
Payer: COMMERCIAL

## 2019-12-18 DIAGNOSIS — H52.7: ICD-10-CM

## 2019-12-18 DIAGNOSIS — H43.393: ICD-10-CM

## 2019-12-18 PROCEDURE — 92015 DETERMINE REFRACTIVE STATE: CPT | Performed by: OPHTHALMOLOGY

## 2019-12-18 PROCEDURE — 92004 COMPRE OPH EXAM NEW PT 1/>: CPT | Performed by: OPHTHALMOLOGY

## 2019-12-18 ASSESSMENT — REFRACTION_AUTOREFRACTION
OS_SPHERE: -0.50
OD_CYLINDER: +0.50
OD_AXIS: 173
OS_CYLINDER: +0.75
OD_SPHERE: -0.75
OS_AXIS: 010

## 2019-12-18 ASSESSMENT — AXIALLENGTH_DERIVED
OS_AL: 24.2101
OD_AL: 24.4111
OS_AL: 24.159
OD_AL: 24.4633
OD_AL: 24.4633
OS_AL: 24.2101

## 2019-12-18 ASSESSMENT — REFRACTION_MANIFEST
OU_VA: 20/
OS_VA2: 20/
OD_CYLINDER: +0.25
OD_VA1: 20/20
OS_AXIS: 010
OD_CYLINDER: +0.25
OD_VA1: 20/20
OD_SPHERE: -0.75
OS_AXIS: 010
OD_VA2: 20/
OS_CYLINDER: +0.50
OS_VA2: 20/
OS_SPHERE: -0.50
OD_VA3: 20/
OS_VA3: 20/
OD_SPHERE: -0.75
OS_VA1: 20/25
OS_VA1: 20/25
OU_VA: 20/
OS_VA3: 20/
OD_AXIS: 175
OD_VA3: 20/
OS_CYLINDER: +0.50
OD_ADD: +1.50
OS_SPHERE: -0.50
OD_AXIS: 175
OD_VA2: 20/
OS_ADD: +1.50

## 2019-12-18 ASSESSMENT — KERATOMETRY
OS_AXISANGLE_DEGREES: 167
OS_K1POWER_DIOPTERS: 42.00
OD_AXISANGLE_DEGREES: 088
OD_K1POWER_DIOPTERS: 41.75
OS_K2POWER_DIOPTERS: 42.25
OD_K2POWER_DIOPTERS: 42.00

## 2019-12-18 ASSESSMENT — CONFRONTATIONAL VISUAL FIELD TEST (CVF)
OD_FINDINGS: FULL
OS_FINDINGS: FULL

## 2019-12-18 ASSESSMENT — SPHEQUIV_DERIVED
OD_SPHEQUIV: -0.625
OD_SPHEQUIV: -0.625
OS_SPHEQUIV: -0.25
OS_SPHEQUIV: -0.25
OD_SPHEQUIV: -0.5
OS_SPHEQUIV: -0.125

## 2019-12-18 ASSESSMENT — VISUAL ACUITY
OD_BCVA: 20/25-2
OS_BCVA: 20/30

## 2019-12-23 ENCOUNTER — APPOINTMENT (OUTPATIENT)
Dept: NEUROSURGERY | Facility: CLINIC | Age: 39
End: 2019-12-23
Payer: MEDICAID

## 2019-12-23 VITALS
HEART RATE: 79 BPM | OXYGEN SATURATION: 98 % | BODY MASS INDEX: 30.73 KG/M2 | RESPIRATION RATE: 16 BRPM | SYSTOLIC BLOOD PRESSURE: 119 MMHG | DIASTOLIC BLOOD PRESSURE: 82 MMHG | WEIGHT: 167 LBS | TEMPERATURE: 98.3 F | HEIGHT: 62 IN

## 2019-12-23 PROCEDURE — 99213 OFFICE O/P EST LOW 20 MIN: CPT

## 2019-12-26 NOTE — ASSESSMENT
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 \par Patient denies any recent severe headaches. \par Discussed with patient and spouse should her seizures worsen, become unmanageable with medication or effect the quality of her life surgery can be an option for her. \par Epilepsy team would localize the are of seizure foci and should it be coming form a specific cavernous malformation we would potentially be able to surgically resect that cavernous malformation. \par If they do not wish to proceed with that option we will repeat MRI brain with and without contrast in one year.

## 2019-12-26 NOTE — PHYSICAL EXAM
[General Appearance - Alert] : alert [General Appearance - In No Acute Distress] : in no acute distress [Person] : oriented to person [Place] : oriented to place [Motor Strength] : muscle strength was normal in all four extremities [Time] : oriented to time [Involuntary Movements] : no involuntary movements were seen [] : no respiratory distress [Abnormal Walk] : normal gait

## 2019-12-26 NOTE — REASON FOR VISIT
[Follow-Up: _____] : a [unfilled] follow-up visit [Spouse] : spouse [FreeTextEntry3] : gianluca [FreeTextEntry1] : Becky Delvalle is a 39yr old female right handed here today for a follow up visit after having recent MRI brain completed. We are monitoring cavernous malformations that she has in the right hemisphere. Her most recent seizure was over a year ago. Today she feels well  has no complaints. \par

## 2019-12-26 NOTE — RESULTS/DATA
[FreeTextEntry1] : EXAM: MR BRAIN WAW IC \par \par \par PROCEDURE DATE: 11/27/2019 \par \par \par \par INTERPRETATION: \par CLINICAL INDICATION: Cavernous angioma. A new right 11/26/2019 320 \par Abnormality \par \par \par Magnetic resonance imaging of the brain was carried out with transaxial \par SPGR, FLAIR, fast spin echo T2 weighted images, axial susceptibility \par weighted series, diffusion weighted series and sagittal T1 weighted series \par on a 3.0 Evie magnet. Post contrast axial, coronal and sagittal T1 weighted \par images were obtained. 7 cc of Gadavist were intravenously injected, 0.5 cc \par were discarded. \par \par Comparison is made with the prior MRI of 12/7/2018. \par \par \par There is no significant interval change since the prior examination. The \par right cerebral hemisphere is slightly smaller than the left and there are \par innumerable foci of hemosiderin deposition throughout the brain parenchyma. \par These consist of small areas of hemosiderin deposition as well as prominent \par signal voids suggesting the presence of multiple veins this is suggestive of \par sporadic or familial multiple cavernoma syndrome. \par \par There is no evidence of acute hemorrhage or edema to suggest acute \par hemorrhage since the prior examination. There is no mass effect. The \par ventricles and sulci are normal in size and position. After contrast \par administration there is mild linear enhancement in the right subcortical \par white matter which has the appearance of multiple enhancing developmental \par venous anomalies. \par \par IMPRESSION: Right cerebral hemisphere slightly smaller than the left. No \par change since 12/7/2018. 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 \par \par \par \par \par \par \par \par \par \par KAYCEE SAXENA M.D., ATTENDING RADIOLOGIST \par This document has been electronically signed. Nov 27 2019 4:03PM

## 2020-01-08 ENCOUNTER — APPOINTMENT (OUTPATIENT)
Dept: NEUROLOGY | Facility: CLINIC | Age: 40
End: 2020-01-08
Payer: MEDICAID

## 2020-01-08 VITALS
WEIGHT: 167 LBS | BODY MASS INDEX: 30.73 KG/M2 | DIASTOLIC BLOOD PRESSURE: 70 MMHG | SYSTOLIC BLOOD PRESSURE: 104 MMHG | HEIGHT: 62 IN | HEART RATE: 76 BPM | TEMPERATURE: 98.6 F | OXYGEN SATURATION: 99 %

## 2020-01-08 PROCEDURE — 99214 OFFICE O/P EST MOD 30 MIN: CPT

## 2020-01-09 LAB
ALBUMIN SERPL ELPH-MCNC: 4.2 G/DL
ALP BLD-CCNC: 91 U/L
ALT SERPL-CCNC: 11 U/L
ANION GAP SERPL CALC-SCNC: 12 MMOL/L
AST SERPL-CCNC: 15 U/L
BASOPHILS # BLD AUTO: 0.03 K/UL
BASOPHILS NFR BLD AUTO: 0.4 %
BILIRUB SERPL-MCNC: 0.2 MG/DL
BUN SERPL-MCNC: 8 MG/DL
CALCIUM SERPL-MCNC: 9.3 MG/DL
CHLORIDE SERPL-SCNC: 104 MMOL/L
CO2 SERPL-SCNC: 24 MMOL/L
CREAT SERPL-MCNC: 0.96 MG/DL
EOSINOPHIL # BLD AUTO: 0.18 K/UL
EOSINOPHIL NFR BLD AUTO: 2.6 %
GLUCOSE SERPL-MCNC: 97 MG/DL
HCT VFR BLD CALC: 42.1 %
HGB BLD-MCNC: 12.9 G/DL
IMM GRANULOCYTES NFR BLD AUTO: 0.4 %
LYMPHOCYTES # BLD AUTO: 2.41 K/UL
LYMPHOCYTES NFR BLD AUTO: 34.9 %
MAN DIFF?: NORMAL
MCHC RBC-ENTMCNC: 27 PG
MCHC RBC-ENTMCNC: 30.6 GM/DL
MCV RBC AUTO: 88.1 FL
MONOCYTES # BLD AUTO: 0.6 K/UL
MONOCYTES NFR BLD AUTO: 8.7 %
NEUTROPHILS # BLD AUTO: 3.66 K/UL
NEUTROPHILS NFR BLD AUTO: 53 %
PLATELET # BLD AUTO: 297 K/UL
POTASSIUM SERPL-SCNC: 3.7 MMOL/L
PROT SERPL-MCNC: 7.1 G/DL
RBC # BLD: 4.78 M/UL
RBC # FLD: 12.9 %
SODIUM SERPL-SCNC: 140 MMOL/L
WBC # FLD AUTO: 6.91 K/UL

## 2020-01-09 NOTE — ASSESSMENT
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 Santo) and DVAs who presented for followup for focal epilepsy characterized by focal aware and impaired awareness sz's (unclear if progresses to true BL tonic clonic).\par \par Rare visual aura around menstrual cycle. Again asked pt to change LEV IR to ER and obtain AED trough levels. Will reevaluate if AED escalation is needed after switching to LEV ER. All questions and concerns answered and addressed in detail to pt and 's complete satisfaction. Patient and her  verbalized understanding and agreed to plan.\par \par \par - switch LEV IR 1gm BID to ER 1gm BID\par - continue LCM 100mg BID\par - cont CLZ ODT 1mg prn aura\par - cont folic acid 4mg daily (actively trying to conceive)\par - CBC, CMP, LEV and LCM trough levels\par - f/u Dr. Santo for cav mal's\par - pt not driving\par - f/u in 2 months\par \par \par All relevant epilepsy AAN quality care measures were addressed and discussed with the patient and her .\par \par More than 50% of time spent counseling and educating patient and her  about epilepsy specific safety issues including AED side effects and interactions, alcohol consumption, sleep deprivation, risks and driving privileges associated with the [place] Guidelines (pt not driving), how treatment may affect contraction and pregnancy, death related to seizures/SUDEP, seizure 1st aid and risks. Patient and  are educated on seizure precautions, including no driving, no operating heavy machinery, no unsupervised swimming or bathing, no unsupervised use of stove, no climbing heights, and no unsupervised use of sharp objects.

## 2020-01-09 NOTE — HISTORY OF PRESENT ILLNESS
[FreeTextEntry1] : LAST CLINIC VISIT: 9/11/19\par \par INTERIM HISTORY:\par Pt today is accompanied by her . Since the last visit, has had two visual aura (seeing stars) around menstrual cycle, aborted with CZP. Sz threshold lowered by sleep deprivation. Hasn't gotten blood level of AEDs as instructed at the last visit. Still taking LEV IR and not ER because pt does not want to toss the remaining IR tablets; has a month of supply left.\par \par Saw Dr. Santo. MRI 11/27/19 stable compared to 12/8/18. No evidence for edema or hemorrhage. Repeat MRI in a yr.\par \par CURRENT AEDs:\par LEV IR 1000mg BID - started 2015\par LCM 100mg BID - started 7/2018\par CLZ ODT 1mg tabs prn - started 8/2018\par \par \par PRIOR EPILEPSY HISTORY:\par 8/7/18: This is a 38 year-old RH [place] female with no significant PMH who is self referred to establish care for epilepsy. Pt today is accompanied by her . Pt was previously followed by neurologist Dr. Chuck Davis. Pt's  provided most of history since pt speaks limited English.\par \par Onset of sz at age 25, during the 3rd month of pregnancy. Pt was in [facility] at the time. She was not started on AED, nor was she started on AED after her second sz at 9th month of pregnancy. After delivery, pt was started on CBZ. She was sz free on CBZ approximately from [id number]. Doctor in [facility] tapered her off of CBZ. When pt unfortunately sustained another sz, she was put back on CBZ. \par \par Pt immig[place] to the [place] around [place]. At the time, she was planning for second pregnancy, so CBZ was switched to LEV 500mg BID. Next seizure occurred on 8/2016, likely triggered by stress. LEV was increased 1gm BID. When pt was sz free for several years on this dosing, LEV was decreased back to 500mg BID. Unfortunately, with lowered dose of LEV, sz recurred on 7/14/18, while pt was in [place]. She was admitted to [facility]. Head imaging found innumerable lesions throughout R supratentorial hemisphere without focal enhancement, edema, or mass effect. Ddx: cav mal, infectious, less likely Sturge Nioclas or petechial hemorrhage. MRA H/N and cerebral angiogram were unremarkable. Pt and  were not aware of this finding. At discharge, LCM 50mg BID was added to LEV 500mg BID.\par \par === 11/8/18 ===\par Pt today is accompanied by her . Clarified that she is really just only LEV 500mg BID (not 1gm BID) and LCM 50mg BID. She was sz free for several yrs on LEV 1gm BID. Sz recurred after LEV was lowered to 500mg BID. Has not had any further sz since last visit. CURRENT AEDs: LEV 500mg BID - started 2015; LCM 50mg BID - started 7/2018; CLZ ODT 0.5mg prn - started 8/2018.\par \par === 2/19/19 ===\par Pt today is accompanied by her . At the last visit, plan was to cross titrate LEV 500mg BID and LCM 50mg BID to LEV 1gm BID. On 11/13, pt was on LEV 1gm BID and LCM 50mg qHS when she had a typical aura. Pt took CLZ ODT. On 12/24, pt was only on LEV 1gm BID and again had another aura, but this time was very mild. She again took CLZ ODT and called the on-call epilepsy fellow, who instructed pt to go back to LEV 500mg BID and LCM 50mg BID. \par \par Pt also saw Dr. 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 Offered cerebral angiography, but pt said she had it done in [place]. She will be bringing the imaging to Dr. Santo once she obtains a copy. Will f/u Dr. Santo and repeat image in a yr. CURRENT AEDs: LEV 500mg BID - started 2015; LCM 50mg BID - started 7/2018; CLZ ODT 0.5mg prn - started 8/2018.\par \par === 6/3/19 ===\par Pt today is accompanied by her . At the last visit, planned to taper off LCM 50mg BID and increase LEV from 500mg BID to 2000mg BID. Three to four days after coming off of LCM, pt had a breakthrough seizure, needed CZP ODT 1mg x2. Pt now on LCM 50mg BID and LEV 1000mg BID. No further sz on this duo-therapy. CURRENT AEDs: LEV 1000mg BID - started 2015, LCM 50mg BID - started 7/2018, CLZ ODT 1mg tabs prn - started 8/2018.\par \par === 9/11/19 ===\par Since last visit, pt has had 4 seizures that started with buzzing noise, visual aura in L eye, nausea and tachycardia. All of which were aborted with CZP ODT. CURRENT AEDs: LEV 1000mg BID - started 2015\par LCM 50mg BID - started 7/2018; CLZ ODT 1mg tabs prn - started 8/2018.\par \par === 11/6/19 ===\par Pt today is accompanied by her . At the last visit, increased LCM from 50mg BID to 100mg BID, which pt did. She was asked to change LEV IR 1gm BID to ER 1gm BID, which pt did not do. Hasn't had any sz since last visit. Saw Dr. Santo; awaiting MRI brain w/wo. CURRENT AEDs: LEV IR 1000mg BID - started 2015; LCM 100mg BID - started 7/2018; CLZ ODT 1mg tabs prn - started 8/2018.\par \par \par SEIZURE DESCRIPTION AND TYPE:\par Type #1\par Severity: focal sz, both aware and impaired (unclear if progresses to true BL tonic clonic sz)\par Onset: 26yo\par Quality & associated signs/symptoms: Prolonged aura of nausea +/- vomiting, geographic shapes (eg. stars, bubbles) in her L visual field -> loss of awareness, head turning/jerking toward the L, tonic-clonic activity in L arm and leg -> postictal confusion, L Miguel Ángel's. Pt developed postictal psychosis (auditory hallucination, paranoia) once after a sz. \par Duration: 20-40s\par Timing: rare before 7/2018, frequency increased to now up to once every 1-2 months; now rare\par Modifying factors: triggered by stress, sleep deprivation\par Diurnal Variation: none\par \par EPILEPSY TYPE: focal epilepsy, structural etiology\par HISTORY OF TONIC-CLONIC SZ: unclear\par HISTORY OF STATUS EPILEPTICUS: no\par \par SEIZURE RISK FACTORS:\par Innumerable R hemispheric brain lesions thought to be cav mal's and DVAs w/o acute bleeding. Patient was a product of normal pregnancy and delivery. No history of febrile seizure, TBI, CNS infection, or FH of seizures.\par \par PREVIOUS AEDs:\par CBZ - started 26yo, stopped ~2015 because pt was planning for pregnancy\par \par IMAGING: \par MRI w/wo 11/27/19 ([facility]): Right cerebral hemisphere slightly smaller than the left. No change since 12/7/2018. 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 \par \par MRI w/wo 12/8/18 ([facility]): Multiple foci of T2 signal abnormalities associated with serpiginous vessels as described, which may represent cavernomatosis with associated DVA's. Lesions are unusually hemispheric as no lesions are seen within the left cerebral hemisphere. While findings may represent familial versus sporadic cavernomatosis, radiation induced cavernomatosis in the appropriate clinical setting may be considered. Other differential diagnoses of vascular malformations are not excluded. Findings are not consistent with neurosarcoidosis or lymphoproliferative abnormality. \par \par CTH 7/15/18 ([place], [place]): Gyriform calcifications in the R temporooccipital regions. Multiple subcentimeter hyperdense lesions in the R cerebral hemisphere. No significant edema or midline shift. \par \par MRI w/wo 7/14/18 ([place], [place]): Innumerable lesions throughout R supratentorial hemisphere without evidence of edema or mass effect. Diffuse mild enhancement throughout R supratentorial hemisphere. Ddx: cav mal, infectious, less likely Sturge Nicolas or petechial hemorrhage.\par \par MRA H/N w/wo 7/15/18 ([place], [place]): normal\par \par Cerebral angiogram 7/17/18 ([place], [place]): no aneurysm or AVM \par \par NEUROPHYSIOLOGY:\par cvEEG 7/16 - 7/17/18 ([facility], [place]): normal awake and asleep, no IED\par \par NEUROPSYCHOLOGY: \par none

## 2020-01-10 LAB — LEVETIRACETAM SERPL-MCNC: 31.8 MCG/ML

## 2020-01-13 LAB
DM LACOSAMIDE: <0.5 UG/ML
LACOSAMIDE (VIMPAT): 6.6 UG/ML

## 2020-04-02 ENCOUNTER — APPOINTMENT (OUTPATIENT)
Dept: NEUROLOGY | Facility: CLINIC | Age: 40
End: 2020-04-02
Payer: MEDICAID

## 2020-04-02 ENCOUNTER — APPOINTMENT (OUTPATIENT)
Dept: NEUROLOGY | Facility: CLINIC | Age: 40
End: 2020-04-02

## 2020-04-02 PROCEDURE — 99441: CPT

## 2020-05-05 ENCOUNTER — APPOINTMENT (OUTPATIENT)
Dept: NEUROLOGY | Facility: CLINIC | Age: 40
End: 2020-05-05
Payer: MEDICAID

## 2020-05-05 PROCEDURE — 99214 OFFICE O/P EST MOD 30 MIN: CPT | Mod: 95

## 2020-05-05 NOTE — DISCUSSION/SUMMARY
[Time Spent: ___ minutes] : I have spent [unfilled] minutes with the patient on the telephone [FreeTextEntry1] : Telemedicine Visit Note:\par \par Patient consented to discussion of medical condition via remote telehealth from home.  Visit conducted in this manner due to the current pandemic.\par Due to the coronavirus outbreak, we are attempting to mitigate exposure to vulnerable patients at risk for a face to face/elective visit.  We will plan to move the scheduled appointments for now towards the summer as feasible, or forward to the next scheduled interval if the patient is stable.  Medication supply and refills were addressed.  \par Discussed with patient current degree of clinical stability.  Patient/caregiver were given opportunity to discuss questions, which were answered. \par x min= 25\par \par Meds: LCM 150mg BID.  LEV ER 1gm bid\par \par Updates:  Seen with [facility] , initially with video chat.\par \par 4/2020 around first 2 weeks of month had seizure:  No visual aura/"bubbles " warning this time.  Felt something prior, lay down. took CLZ x2.  Felt out of control, unable to speak.  No overt convulsion, ?LOC.  felt n/v afterwards, incontinence.  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 \par - refill CLZ ODT 1mg prn aura\par - cont folic acid 4mg daily.  some teratogenitic risk with combo AED.  pt/hus planning to postpone pregnancy for now.\par - no driving\par -f/u 1-2mo Dr Betancourt

## 2020-05-07 ENCOUNTER — APPOINTMENT (OUTPATIENT)
Dept: NEUROLOGY | Facility: CLINIC | Age: 40
End: 2020-05-07

## 2020-06-02 ENCOUNTER — APPOINTMENT (OUTPATIENT)
Dept: NEUROLOGY | Facility: CLINIC | Age: 40
End: 2020-06-02
Payer: MEDICAID

## 2020-06-02 PROCEDURE — 99214 OFFICE O/P EST MOD 30 MIN: CPT | Mod: 95

## 2020-06-03 NOTE — PHYSICAL EXAM
[FreeTextEntry1] : GENERAL PHYSICAL EXAM:\par GEN: no distress, normal affect\par HEENT: NCAT\par CV/PULM/ABD: deferred due to telehealth encounter \par \par NEUROLOGICAL EXAM:\par Alert and oriented to person, place, time, and situation \par Clear and fluent language, intact comprehension and repetition\par Grossly looks in all directs\par No obvious facial asymmetry \par Hearing intact \par BL shoulder shrug intact \par Moves all EXTs spontaneously\par Ambulates independently\par \par

## 2020-06-03 NOTE — ASSESSMENT
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 Santo) and DVAs who presented for followup for focal epilepsy characterized by focal aware and impaired awareness sz's (unclear if progresses to true BL tonic clonic).\par \par Continues to have occasional seizures. Will increase LEV next. Consider EMU if seizures remain refractory. All questions and concerns answered and addressed in detail to pt and 's complete satisfaction. Patient and her  verbalized understanding and agreed to plan.\par \par \par - continue LCM 200mg BID and LEV ER 1000mg BID\par - cont CLZ ODT 1mg prn aura\par - cont folic acid 4mg daily (consider decreasing to 1mg daily since pt no longer trying to conceive)\par - f/u Dr. Santo for cav mal's\par - pt not driving\par - f/u in 1 month\par \par \par All relevant epilepsy AAN quality care measures were addressed and discussed with the patient and her .\par \par More than 50% of time spent counseling and educating patient and her  about epilepsy specific safety issues including AED side effects and interactions, alcohol consumption, sleep deprivation, risks and driving privileges associated with the [place] Guidelines (pt not driving), how treatment may affect contraction and pregnancy, death related to seizures/SUDEP, seizure 1st aid and risks. Patient and  are educated on seizure precautions, including no driving, no operating heavy machinery, no unsupervised swimming or bathing, no unsupervised use of stove, no climbing heights, and no unsupervised use of sharp objects. \par \par \par Patient consented to the teleservice as stated below. Verbal consent given on 06/02/2020 by patient, JERRY BUTCHER.\par \par Informed Consent for Telehealth Services During a Public Health Emergency\par \par By virtue of my participation in this telehealth visit, I am consenting to receive care through telehealth. Telehealth is the use of electronic information and communication technologies by providers to deliver health care to patients at a distance.\par \par I understand that any care provided to me through [employer]’s telehealth application (“the [employer]a”) will incorporate security protocols to protect the privacy and security of my health information. If any other application is used to provide care to me, I understand that the technology may not contain appropriate security protocols to protect the privacy and security of my health information. My provider has explained to me the risks associated with the technology platforms that he or she is using to provide care to me. I acknowledge that there are potential risks associated with any technology used while obtaining care through telehealth, including, but not limited to, connectively interruptions, other technical difficulties, and unauthorized access by a third party to one’s health information. Despite these risks, I agree to participate in the telehealth encounter.\par \par I understand and agree that I or my healthcare provider may terminate a telehealth encounter at any time in the event of a technical malfunction.\par \par I also understand that my location determines where medicine is being practiced. As a result, I will inform my provider where I am located at the time of my telehealth visit.\par \par I understand that there may be costs associated with a telehealth visit. I agree that I am responsible for any fees associated with the telehealth services that I receive.\par \par This Informed Consent for Telehealth Services During a Public Health Emergency will remain in effect solely during the term of the public health emergency.\par \par By signing below I certify that:\par \par I have read or had this form read and/or had this form explained to me;\par \par I fully understand the contents of this document, including the risks and benefits of receiving telehealth services; and\par \par I have been given ample opportunity to discuss any questions I may have regarding the telehealth services and that all of my questions have been answered to my satisfaction

## 2020-06-03 NOTE — HISTORY OF PRESENT ILLNESS
[Home] : at home, [unfilled] , at the time of the visit. [Other Location: e.g. Home (Enter Location, City,State)___] : at [unfilled] [Spouse] : spouse [Verbal consent obtained from patient] : the patient, [unfilled] [FreeTextEntry1] : LAST CLINIC VISIT: 5/5/20 (Dr. Navarro)\par \par PCP: Dr. Gayathri Delvalle\par \par INTERIM HISTORY:\par Had a very small aura early 4/2020, aborted with CLZ x2. LCM 150mg BID increased to 200mg BID by Dr. Navarro on 5/5. No further sz. Pt and  expressed that they are no longer actively trying to conceive.\par \par CURRENT AEDs:\par LEV ER 1000mg BID - started 2015\par LCM 200mg BID - started 7/2018\par CLZ ODT 1mg tabs prn - started 8/2018\par \par \par PRIOR EPILEPSY HISTORY:\par 8/7/18: This is a 38 year-old RH [place]ian female with no significant PMH who is self referred to establish care for epilepsy. Pt today is accompanied by her . Pt was previously followed by neurologist Dr. Chuck Davis. Pt's  provided most of history since pt speaks limited English.\par \par Onset of sz at age 25, during the 3rd month of pregnancy. Pt was in [facility] at the time. She was not started on AED, nor was she started on AED after her second sz at 9th month of pregnancy. After delivery, pt was started on CBZ. She was sz free on CBZ approximately from [id number]. Doctor in [facility] tapered her off of CBZ. When pt unfortunately sustained another sz, she was put back on CBZ. \par \par [place] to the [place] around [place]. At the time, she was planning for second pregnancy, so CBZ was switched to LEV 500mg BID. Next seizure occurred on 8/2016, likely triggered by stress. LEV was increased 1gm BID. When pt was sz free for several years on this dosing, LEV was decreased back to 500mg BID. Unfortunately, with lowered dose of LEV, sz recurred on 7/14/18, while pt was in [place]. She was admitted to [facility]. Head imaging found innumerable lesions throughout R supratentorial hemisphere without focal enhancement, edema, or mass effect. Ddx: cav mal, infectious, less likely Sturge Nicolas or petechial hemorrhage. MRA H/N and cerebral angiogram were unremarkable. Pt and  were not aware of this finding. At discharge, LCM 50mg BID was added to LEV 500mg BID.\par \par === 11/8/18 ===\par Pt today is accompanied by her . Clarified that she is really just only LEV 500mg BID (not 1gm BID) and LCM 50mg BID. She was sz free for several yrs on LEV 1gm BID. Sz recurred after LEV was lowered to 500mg BID. Has not had any further sz since last visit. CURRENT AEDs: LEV 500mg BID - started 2015; LCM 50mg BID - started 7/2018; CLZ ODT 0.5mg prn - started 8/2018.\par \par === 2/19/19 ===\par Pt today is accompanied by her . At the last visit, plan was to cross titrate LEV 500mg BID and LCM 50mg BID to LEV 1gm BID. On 11/13, pt was on LEV 1gm BID and LCM 50mg qHS when she had a typical aura. Pt took CLZ ODT. On 12/24, pt was only on LEV 1gm BID and again had another aura, but this time was very mild. She again took CLZ ODT and called the on-call epilepsy fellow, who instructed pt to go back to LEV 500mg BID and LCM 50mg BID. \par \par Pt also saw Dr. 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 Offered cerebral angiography, but pt said she had it done in [place]. She will be bringing the imaging to Dr. Santo once she obtains a copy. Will f/u Dr. Santo and repeat image in a yr. CURRENT AEDs: LEV 500mg BID - started 2015; LCM 50mg BID - started 7/2018; CLZ ODT 0.5mg prn - started 8/2018.\par \par === 6/3/19 ===\par Pt today is accompanied by her . At the last visit, planned to taper off LCM 50mg BID and increase LEV from 500mg BID to 2000mg BID. Three to four days after coming off of LCM, pt had a breakthrough seizure, needed CZP ODT 1mg x2. Pt now on LCM 50mg BID and LEV 1000mg BID. No further sz on this duo-therapy. CURRENT AEDs: LEV 1000mg BID - started 2015, LCM 50mg BID - started 7/2018, CLZ ODT 1mg tabs prn - started 8/2018.\par \par === 9/11/19 ===\par Since last visit, pt has had 4 seizures that started with buzzing noise, visual aura in L eye, nausea and tachycardia. All of which were aborted with CZP ODT. CURRENT AEDs: LEV 1000mg BID - started 2015\par LCM 50mg BID - started 7/2018; CLZ ODT 1mg tabs prn - started 8/2018.\par \par === 11/6/19 ===\par Pt today is accompanied by her . At the last visit, increased LCM from 50mg BID to 100mg BID, which pt did. She was asked to change LEV IR 1gm BID to ER 1gm BID, which pt did not do. Hasn't had any sz since last visit. Saw Dr. Santo; awaiting MRI brain w/wo. CURRENT AEDs: LEV IR 1000mg BID - started 2015; LCM 100mg BID - started 7/2018; CLZ ODT 1mg tabs prn - started 8/2018.\par \par === 1/8/20 ===\par Pt today is accompanied by her . Since the last visit, has had two visual aura (seeing stars) around menstrual cycle, aborted with CZP. Sz threshold lowered by sleep deprivation. Hasn't gotten blood level of AEDs as instructed at the last visit. Still taking LEV IR and not ER because pt does not want to toss the remaining IR tablets; has a month of supply left. Saw Dr. Santo. MRI 11/27/19 stable compared to 12/8/18. No evidence for edema or hemorrhage. Repeat MRI in a yr. CURRENT AEDs: LEV IR 1000mg BID - started 2015; LCM 100mg BID - started 7/2018; CLZ ODT 1mg tabs prn - started 8/2018.\par \par \par SEIZURE DESCRIPTION AND TYPE:\par Type #1\par Severity: focal sz, both aware and impaired (unclear if progresses to true BL tonic clonic sz)\par Onset: 26yo\par Quality & associated signs/symptoms: Prolonged aura of nausea +/- vomiting, geographic shapes (eg. stars, bubbles) in her L visual field -> loss of awareness, head turning/jerking toward the L, tonic-clonic activity in L arm and leg -> postictal confusion, L Miguel Ángel's. Pt developed postictal psychosis (auditory hallucination, paranoia) once after a sz. 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 Patient was a product of normal pregnancy and delivery. No history of febrile seizure, TBI, CNS infection, or FH of seizures.\par \par PREVIOUS AEDs:\par CBZ - started 26yo, stopped ~2015 because pt was planning for pregnancy\par \par IMAGING: \par MRI w/wo 11/27/19 ([facility]): Right cerebral hemisphere slightly smaller than the left. No change since 12/7/2018. 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 \par \par MRI w/wo 12/8/18 ([facility]): Multiple foci of T2 signal abnormalities associated with serpiginous vessels as described, which may represent cavernomatosis with associated DVA's. Lesions are unusually hemispheric as no lesions are seen within the left cerebral hemisphere. While findings may represent familial versus sporadic cavernomatosis, radiation induced cavernomatosis in the appropriate clinical setting may be considered. Other differential diagnoses of vascular malformations are not excluded. Findings are not consistent with neurosarcoidosis or lymphoproliferative abnormality. \par \par CTH 7/15/18 ([place], [place]): Gyriform calcifications in the R temporooccipital regions. Multiple subcentimeter hyperdense lesions in the R cerebral hemisphere. No significant edema or midline shift. \par \par MRI w/wo 7/14/18 ([place], [place]): Innumerable lesions throughout R supratentorial hemisphere without evidence of edema or mass effect. Diffuse mild enhancement throughout R supratentorial hemisphere. Ddx: cav mal, infectious, less likely Sturge Nicolas or petechial hemorrhage.\par \par MRA H/N w/wo 7/15/18 ([facility], [place]): normal\par \par Cerebral angiogram 7/17/18 ([place], [place]): no aneurysm or AVM \par \par NEUROPHYSIOLOGY:\par cvEEG 7/16 - 7/17/18 ([place], [place]): normal awake and asleep, no IED\par \par NEUROPSYCHOLOGY: \par none\par

## 2020-07-08 ENCOUNTER — APPOINTMENT (OUTPATIENT)
Dept: NEUROLOGY | Facility: CLINIC | Age: 40
End: 2020-07-08
Payer: MEDICAID

## 2020-07-08 PROCEDURE — 99214 OFFICE O/P EST MOD 30 MIN: CPT | Mod: 95

## 2020-07-10 NOTE — CONSULT LETTER
[Sincerely,] : Sincerely, [Dear  ___] : Dear  [unfilled], [FreeTextEntry3] : Lluvia Betancourt MD\par [facility]\par [facility] [place]\par [address] [place] [address]\par Phone: [phone]; Fax: [phone]\par \par   [FreeTextEntry1] : I had the pleasure of seeing your patient, JERRY BUTCHER, today. Please see my note below. \par \par If you have any questions, please do not hesitate to contact me.

## 2020-07-10 NOTE — ASSESSMENT
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 Santo) and DVAs who presented for followup for focal epilepsy characterized by focal aware and impaired awareness sz's (unclear if progresses to true BL tonic clonic).\par \par Although no sz last 2 months, pt and  requested to go up on LEV prophylactically. All questions and concerns answered and addressed in detail to pt and 's complete satisfaction. Patient and her  verbalized understanding and agreed to plan.\par \par \par - increase LEV ER 1000mg BID to 1500mg BID\par - continue LCM 200mg BID \par - cont CLZ ODT 1mg prn aura\par - cont folic acid 4mg daily (prefers to stay on 4mg even though no plan to conceive)\par - f/u Dr. Santo for cav mal's\par - pt not driving\par - f/u in 1 month: check AED levels\par \par \par All relevant epilepsy AAN quality care measures were addressed and discussed with the patient and her .\par \par More than 50% of time spent counseling and educating patient and her  about epilepsy specific safety issues including AED side effects and interactions, alcohol consumption, sleep deprivation, risks and driving privileges associated with the [place] Guidelines (pt not driving), how treatment may affect contraction and pregnancy, death related to seizures/SUDEP, seizure 1st aid and risks. Patient and  are educated on seizure precautions, including no driving, no operating heavy machinery, no unsupervised swimming or bathing, no unsupervised use of stove, no climbing heights, and no unsupervised use of sharp objects. \par \par \par Patient consented to the teleservice as stated below. Verbal consent given on 07/08/2020 by patient, JERRY BUTCHER.\par \par Informed Consent for Telehealth Services During a Public Health Emergency\par \par By virtue of my participation in this telehealth visit, I am consenting to receive care through telehealth. Telehealth is the use of electronic information and communication technologies by providers to deliver health care to patients at a distance.\par \par I understand that any care provided to me through [employer]’s telehealth application (“the [employer] micha”) will incorporate security protocols to protect the privacy and security of my health information. If any other application is used to provide care to me, I understand that the technology may not contain appropriate security protocols to protect the privacy and security of my health information. My provider has explained to me the risks associated with the technology platforms that he or she is using to provide care to me. I acknowledge that there are potential risks associated with any technology used while obtaining care through telehealth, including, but not limited to, connectively interruptions, other technical difficulties, and unauthorized access by a third party to one’s health information. Despite these risks, I agree to participate in the telehealth encounter.\par \par I understand and agree that I or my healthcare provider may terminate a telehealth encounter at any time in the event of a technical malfunction.\par \par I also understand that my location determines where medicine is being practiced. As a result, I will inform my provider where I am located at the time of my telehealth visit.\par \par I understand that there may be costs associated with a telehealth visit. I agree that I am responsible for any fees associated with the telehealth services that I receive.\par \par This Informed Consent for Telehealth Services During a Public Health Emergency will remain in effect solely during the term of the public health emergency.\par \par By signing below I certify that:\par \par I have read or had this form read and/or had this form explained to me;\par \par I fully understand the contents of this document, including the risks and benefits of receiving telehealth services; and\par \par I have been given ample opportunity to discuss any questions I may have regarding the telehealth services and that all of my questions have been answered to my satisfaction

## 2020-07-10 NOTE — HISTORY OF PRESENT ILLNESS
[Home] : at home, [unfilled] , at the time of the visit. [Other Location: e.g. Home (Enter Location, City,State)___] : at [unfilled] [Spouse] : spouse [Verbal consent obtained from patient] : the patient, [unfilled] [FreeTextEntry1] : LAST CLINIC VISIT: 6/2/20\par \par PCP: Dr. Gayathri Delvalle\par  nickname: Nora De La Cruz)\par \par INTERIM HISTORY:\par No further sz since last clinic visit.\par \par CURRENT AEDs:\par LEV ER 1000mg BID - started 2015; level 31.8 on 1/8/20\par LCM 200mg BID - started 7/2018; level 6.6 on 1/8/20 on 100mg BID\par CLZ ODT 1mg tabs prn - started 8/2018\par \par \par PRIOR EPILEPSY HISTORY:\par 8/7/18: This is a 38 year-old RH Marshallese female with no significant PMH who is self referred to establish care for epilepsy. Pt today is accompanied by her . Pt was previously followed by neurologist Dr. Chuck Davis. Pt's  provided most of history since pt speaks limited English.\par \par Onset of sz at age 25, during the 3rd month of pregnancy. Pt was in [facility] at the time. She was not started on AED, nor was she started on AED after her second sz at 9th month of pregnancy. After delivery, pt was started on CBZ. She was sz free on CBZ approximately from [id number]. Doctor in [facility] tapered her off of CBZ. When pt unfortunately sustained another sz, she was put back on CBZ. \par \par [place] around [place]. At the time, she was planning for second pregnancy, so CBZ was switched to LEV 500mg BID. Next seizure occurred on 8/2016, likely triggered by stress. LEV was increased 1gm BID. When pt was sz free for several years on this dosing, LEV was decreased back to 500mg BID. Unfortunately, with lowered dose of LEV, sz recurred on 7/14/18, while pt was in [place]. She was admitted to [facility]. Head imaging found innumerable lesions throughout R supratentorial hemisphere without focal enhancement, edema, or mass effect. Ddx: cav mal, infectious, less likely Sturge Nicolas or petechial hemorrhage. MRA H/N and cerebral angiogram were unremarkable. Pt and  were not aware of this finding. At discharge, LCM 50mg BID was added to LEV 500mg BID.\par \par === 11/8/18 ===\par Pt today is accompanied by her . Clarified that she is really just only LEV 500mg BID (not 1gm BID) and LCM 50mg BID. She was sz free for several yrs on LEV 1gm BID. Sz recurred after LEV was lowered to 500mg BID. Has not had any further sz since last visit. CURRENT AEDs: LEV 500mg BID - started 2015; LCM 50mg BID - started 7/2018; CLZ ODT 0.5mg prn - started 8/2018.\par \par === 2/19/19 ===\par Pt today is accompanied by her . At the last visit, plan was to cross titrate LEV 500mg BID and LCM 50mg BID to LEV 1gm BID. On 11/13, pt was on LEV 1gm BID and LCM 50mg qHS when she had a typical aura. Pt took CLZ ODT. On 12/24, pt was only on LEV 1gm BID and again had another aura, but this time was very mild. She again took CLZ ODT and called the on-call epilepsy fellow, who instructed pt to go back to LEV 500mg BID and LCM 50mg BID. \par \par Pt also saw Dr. 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 Offered cerebral angiography, but pt said she had it done in [place]. She will be bringing the imaging to Dr. Santo once she obtains a copy. Will f/u Dr. Santo and repeat image in a yr. CURRENT AEDs: LEV 500mg BID - started 2015; LCM 50mg BID - started 7/2018; CLZ ODT 0.5mg prn - started 8/2018.\par \par === 6/3/19 ===\par Pt today is accompanied by her . At the last visit, planned to taper off LCM 50mg BID and increase LEV from 500mg BID to 2000mg BID. Three to four days after coming off of LCM, pt had a breakthrough seizure, needed CZP ODT 1mg x2. Pt now on LCM 50mg BID and LEV 1000mg BID. No further sz on this duo-therapy. CURRENT AEDs: LEV 1000mg BID - started 2015, LCM 50mg BID - started 7/2018, CLZ ODT 1mg tabs prn - started 8/2018.\par \par === 9/11/19 ===\par Since last visit, pt has had 4 seizures that started with buzzing noise, visual aura in L eye, nausea and tachycardia. All of which were aborted with CZP ODT. CURRENT AEDs: LEV 1000mg BID - started 2015\par LCM 50mg BID - started 7/2018; CLZ ODT 1mg tabs prn - started 8/2018.\par \par === 11/6/19 ===\par Pt today is accompanied by her . At the last visit, increased LCM from 50mg BID to 100mg BID, which pt did. She was asked to change LEV IR 1gm BID to ER 1gm BID, which pt did not do. Hasn't had any sz since last visit. Saw Dr. Santo; awaiting MRI brain w/wo. CURRENT AEDs: LEV IR 1000mg BID - started 2015; LCM 100mg BID - started 7/2018; CLZ ODT 1mg tabs prn - started 8/2018.\par \par === 1/8/20 ===\par Pt today is accompanied by her . Since the last visit, has had two visual aura (seeing stars) around menstrual cycle, aborted with CZP. Sz threshold lowered by sleep deprivation. Hasn't gotten blood level of AEDs as instructed at the last visit. Still taking LEV IR and not ER because pt does not want to toss the remaining IR tablets; has a month of supply left. Saw Dr. Santo. MRI 11/27/19 stable compared to 12/8/18. No evidence for edema or hemorrhage. Repeat MRI in a yr. CURRENT AEDs: LEV IR 1000mg BID - started 2015; LCM 100mg BID - started 7/2018; CLZ ODT 1mg tabs prn - started 8/2018.\par \par === 6/2/20 ===\par Had a very small aura early 4/2020, aborted with CLZ x2. LCM 150mg BID increased to 200mg BID by Dr. Navarro on 5/5. No further sz. Pt and  expressed that they are no longer actively trying to conceive. CURRENT AEDs: LEV ER 1000mg BID - started 2015; LCM 200mg BID - started 7/2018; CLZ ODT 1mg tabs prn - started 8/2018.\par \par \par SEIZURE DESCRIPTION AND TYPE:\par Type #1\par Severity: focal sz, both aware and impaired (unclear if progresses to true BL tonic clonic sz)\par Onset: 24yo\par Quality & associated signs/symptoms: Prolonged aura of nausea +/- vomiting, geographic shapes (eg. stars, bubbles) in her L visual field -> loss of awareness, head turning/jerking toward the L, tonic-clonic activity in L arm and leg -> postictal confusion, L Miguel Ángel's. Pt developed postictal psychosis (auditory hallucination, paranoia) once after a sz. 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 Patient was a product of normal pregnancy and delivery. No history of febrile seizure, TBI, CNS infection, or FH of seizures.\par \par PREVIOUS AEDs:\par CBZ - started 24yo, stopped ~2015 because pt was planning for pregnancy\par \par IMAGING: \par MRI w/wo 11/27/19 ([facility]): Right cerebral hemisphere slightly smaller than the left. No change since 12/7/2018. 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 \par \par MRI w/wo 12/8/18 ([facility]): Multiple foci of T2 signal abnormalities associated with serpiginous vessels as described, which may represent cavernomatosis with associated DVA's. Lesions are unusually hemispheric as no lesions are seen within the left cerebral hemisphere. While findings may represent familial versus sporadic cavernomatosis, radiation induced cavernomatosis in the appropriate clinical setting may be considered. Other differential diagnoses of vascular malformations are not excluded. Findings are not consistent with neurosarcoidosis or lymphoproliferative abnormality. \par \par CTH 7/15/18 ([place], [place]): Gyriform calcifications in the R temporooccipital regions. Multiple subcentimeter hyperdense lesions in the R cerebral hemisphere. No significant edema or midline shift. \par \par MRI w/wo 7/14/18 ([place], [place]): Innumerable lesions throughout R supratentorial hemisphere without evidence of edema or mass effect. Diffuse mild enhancement throughout R supratentorial hemisphere. Ddx: cav mal, infectious, less likely Sturge Nicolas or petechial hemorrhage.\par \par MRA H/N w/wo 7/15/18 ([facility], [place]): normal\par \par Cerebral angiogram 7/17/18 ([place], [place]): no aneurysm or AVM \par \par NEUROPHYSIOLOGY:\par cvEEG 7/16 - 7/17/18 ([place], [place]): normal awake and asleep, no IED\par \par NEUROPSYCHOLOGY: \par none

## 2020-08-13 ENCOUNTER — LABORATORY RESULT (OUTPATIENT)
Age: 40
End: 2020-08-13

## 2020-08-13 ENCOUNTER — APPOINTMENT (OUTPATIENT)
Dept: NEUROLOGY | Facility: CLINIC | Age: 40
End: 2020-08-13
Payer: MEDICAID

## 2020-08-13 VITALS
SYSTOLIC BLOOD PRESSURE: 122 MMHG | WEIGHT: 162 LBS | TEMPERATURE: 98.4 F | OXYGEN SATURATION: 99 % | BODY MASS INDEX: 29.81 KG/M2 | DIASTOLIC BLOOD PRESSURE: 83 MMHG | HEIGHT: 62 IN | HEART RATE: 76 BPM

## 2020-08-13 PROCEDURE — 99214 OFFICE O/P EST MOD 30 MIN: CPT

## 2020-08-14 NOTE — HISTORY OF PRESENT ILLNESS
[FreeTextEntry1] : LAST CLINIC VISIT: 7/8/20\par \par PCP: Dr. Brianne Benson\par  nickname: Nora De La Cruz)\par \par INTERIM HISTORY:\par Increased LEV from 1000mg BID to 1500mg BID at the last visit. Had two focal seizures, aborted with CZP. Most breakthrough seizures occur around menses.\par \par CURRENT AEDs:\par LEV ER 1500mg BID - started 2015; level 31.8 on 1/8/20 on 1000mg BID\par LCM 200mg BID - started 7/2018; level 6.6 on 1/8/20 on 100mg BID\par CZP ODT 1mg tabs prn - started 8/2018\par \par \par PRIOR EPILEPSY HISTORY:\par 8/7/18: This is a 38 year-old RH [place] female with no significant PMH who is self referred to establish care for epilepsy. Pt today is accompanied by her . Pt was previously followed by neurologist Dr. Chuck Davis. Pt's  provided most of history since pt speaks limited English.\par \par Onset of sz at age 25, during the 3rd month of pregnancy. Pt was in [facility] at the time. She was not started on AED, nor was she started on AED after her second sz at 9th month of pregnancy. After delivery, pt was started on CBZ. She was sz free on CBZ approximately from 5414-0415. Doctor in [facility] tapered her off of CBZ. When pt unfortunately sustained another sz, she was put back on CBZ. \par \par [place] the [place] around [place]. At the time, she was planning for second pregnancy, so CBZ was switched to LEV 500mg BID. Next seizure occurred on 8/2016, likely triggered by stress. LEV was increased 1gm BID. When pt was sz free for several years on this dosing, LEV was decreased back to 500mg BID. Unfortunately, with lowered dose of LEV, sz recurred on 7/14/18, while pt was in [place]. She was admitted to [facility]. Head imaging found innumerable lesions throughout R supratentorial hemisphere without focal enhancement, edema, or mass effect. Ddx: cav mal, infectious, less likely Sturge Nicolas or petechial hemorrhage. MRA H/N and cerebral angiogram were unremarkable. Pt and  were not aware of this finding. At discharge, LCM 50mg BID was added to LEV 500mg BID.\par \par === 11/8/18 ===\par Pt today is accompanied by her . Clarified that she is really just only LEV 500mg BID (not 1gm BID) and LCM 50mg BID. She was sz free for several yrs on LEV 1gm BID. Sz recurred after LEV was lowered to 500mg BID. Has not had any further sz since last visit. CURRENT AEDs: LEV 500mg BID - started 2015; LCM 50mg BID - started 7/2018; CLZ ODT 0.5mg prn - started 8/2018.\par \par === 2/19/19 ===\par Pt today is accompanied by her . At the last visit, plan was to cross titrate LEV 500mg BID and LCM 50mg BID to LEV 1gm BID. On 11/13, pt was on LEV 1gm BID and LCM 50mg qHS when she had a typical aura. Pt took CLZ ODT. On 12/24, pt was only on LEV 1gm BID and again had another aura, but this time was very mild. She again took CLZ ODT and called the on-call epilepsy fellow, who instructed pt to go back to LEV 500mg BID and LCM 50mg BID. \par \par Pt also saw Dr. 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 Offered cerebral angiography, but pt said she had it done in [place]. She will be bringing the imaging to Dr. Santo once she obtains a copy. Will f/u Dr. Santo and repeat image in a yr. CURRENT AEDs: LEV 500mg BID - started 2015; LCM 50mg BID - started 7/2018; CLZ ODT 0.5mg prn - started 8/2018.\par \par === 6/3/19 ===\par Pt today is accompanied by her . At the last visit, planned to taper off LCM 50mg BID and increase LEV from 500mg BID to 2000mg BID. Three to four days after coming off of LCM, pt had a breakthrough seizure, needed CZP ODT 1mg x2. Pt now on LCM 50mg BID and LEV 1000mg BID. No further sz on this duo-therapy. CURRENT AEDs: LEV 1000mg BID - started 2015, LCM 50mg BID - started 7/2018, CLZ ODT 1mg tabs prn - started 8/2018.\par \par === 9/11/19 ===\par Since last visit, pt has had 4 seizures that started with buzzing noise, visual aura in L eye, nausea and tachycardia. All of which were aborted with CZP ODT. CURRENT AEDs: LEV 1000mg BID - started 2015\par LCM 50mg BID - started 7/2018; CLZ ODT 1mg tabs prn - started 8/2018.\par \par === 11/6/19 ===\par Pt today is accompanied by her . At the last visit, increased LCM from 50mg BID to 100mg BID, which pt did. She was asked to change LEV IR 1gm BID to ER 1gm BID, which pt did not do. Hasn't had any sz since last visit. Saw Dr. Santo; awaiting MRI brain w/wo. CURRENT AEDs: LEV IR 1000mg BID - started 2015; LCM 100mg BID - started 7/2018; CLZ ODT 1mg tabs prn - started 8/2018.\par \par === 1/8/20 ===\par Pt today is accompanied by her . Since the last visit, has had two visual aura (seeing stars) around menstrual cycle, aborted with CZP. Sz threshold lowered by sleep deprivation. Hasn't gotten blood level of AEDs as instructed at the last visit. Still taking LEV IR and not ER because pt does not want to toss the remaining IR tablets; has a month of supply left. Saw Dr. Santo. MRI 11/27/19 stable compared to 12/8/18. No evidence for edema or hemorrhage. Repeat MRI in a yr. CURRENT AEDs: LEV IR 1000mg BID - started 2015; LCM 100mg BID - started 7/2018; CLZ ODT 1mg tabs prn - started 8/2018.\par \par === 6/2/20 ===\par Had a very small aura early 4/2020, aborted with CLZ x2. LCM 150mg BID increased to 200mg BID by Dr. Navarro on 5/5. No further sz. Pt and  expressed that they are no longer actively trying to conceive. CURRENT AEDs: LEV ER 1000mg BID - started 2015; LCM 200mg BID - started 7/2018; CLZ ODT 1mg tabs prn - started 8/2018.\par \par \par SEIZURE DESCRIPTION AND TYPE:\par Type #1\par Severity: focal sz, both aware and impaired (unclear if progresses to true BL tonic clonic sz)\par Onset: 26yo\par Quality & associated signs/symptoms: Prolonged aura of nausea +/- vomiting, geographic shapes (eg. stars, bubbles) in her L visual field -> loss of awareness, head turning/jerking toward the L, tonic-clonic activity in L arm and leg -> postictal confusion, L Miguel Ángel's. Pt developed postictal psychosis (auditory hallucination, paranoia) once after a sz. 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 Patient was a product of normal pregnancy and delivery. No history of febrile seizure, TBI, CNS infection, or FH of seizures.\par \par PREVIOUS AEDs:\par CBZ - started 26yo, stopped ~2015 because pt was planning for pregnancy\par \par IMAGING: \par MRI w/wo 11/27/19 ([facility]): Right cerebral hemisphere slightly smaller than the left. No change since 12/7/2018. 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 \par \par MRI w/wo 12/8/18 ([facility]): Multiple foci of T2 signal abnormalities associated with serpiginous vessels as described, which may represent cavernomatosis with associated DVA's. Lesions are unusually hemispheric as no lesions are seen within the left cerebral hemisphere. While findings may represent familial versus sporadic cavernomatosis, radiation induced cavernomatosis in the appropriate clinical setting may be considered. Other differential diagnoses of vascular malformations are not excluded. Findings are not consistent with neurosarcoidosis or lymphoproliferative abnormality. \par \par CTH 7/15/18 ([place], [place]): Gyriform calcifications in the R temporooccipital regions. Multiple subcentimeter hyperdense lesions in the R cerebral hemisphere. No significant edema or midline shift. \par \par MRI w/wo 7/14/18 ([place], [place]): Innumerable lesions throughout R supratentorial hemisphere without evidence of edema or mass effect. Diffuse mild enhancement throughout R supratentorial hemisphere. Ddx: cav mal, infectious, less likely Sturge Nicolas or petechial hemorrhage.\par \par MRA H/N w/wo 7/15/18 ([place], [place]): normal\par \par Cerebral angiogram 7/17/18 ([place], [place]): no aneurysm or AVM \par \par NEUROPHYSIOLOGY:\par cvEEG 7/16 - 7/17/18 ([place], [place]): normal awake and asleep, no IED\par \par NEUROPSYCHOLOGY: \par none

## 2020-08-14 NOTE — CONSULT LETTER
[Sincerely,] : Sincerely, [Dear  ___] : Dear  [unfilled], [FreeTextEntry1] : I had the pleasure of seeing your patient, JERRY BUTCHER, in my office today. Please see my note below. \par \par If you have any questions, please do not hesitate to contact me.  [FreeTextEntry3] : Lluvia Betancourt MD\par [facility]\par [facility] [place]\par [address] [place] [address]\par Phone: [phone]; Fax: [phone]\par \par

## 2020-08-14 NOTE — ASSESSMENT
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 Santo) and DVAs who presented for followup for focal epilepsy characterized by focal aware and impaired awareness sz's (unclear if progresses to true BL tonic clonic).\par \par Seizures remain intractable, but associated with menses. Will add third agent. Advised pt to see her gynecologist to start contraceptives to stop the hormonal cycles. Will also admit to Epilepsy Monitoring Unit (EMU) to better localize sz onset in preparation for possible surgery. All questions and concerns answered and addressed in detail to pt and 's complete satisfaction. Patient and her  verbalized understanding and agreed to plan.\par \par \par - Admit to EMU. The purpose of the EMU admission is for identification and localization of a seizure focus in a patient with intractable epilepsy who is being considered for surgery. The expected length of stay is 5-6 days.\par - start CLB: 5mg qHS for few nights -> 10mg qHS\par - continue LEV ER 1500mg BID\par - continue LCM 200mg BID \par - cont CLZ ODT 1mg prn aura\par - cont folic acid 4mg daily (prefers to stay on 4mg even though no plan to conceive)\par - f/u gyn to start contraceptives with goal to suppression hormonal fluctuation and menstruation\par - f/u Dr. Santo for cav mal's\par - LEV and LCM levels \par - pt not driving\par - f/u in 1 month\par \par \par All relevant epilepsy AAN quality care measures were addressed and discussed with the patient and her .\par \par More than 50% of time spent counseling and educating patient and her  about epilepsy specific safety issues including AED side effects and interactions, alcohol consumption, sleep deprivation, risks and driving privileges associated with the [place] Guidelines (pt not driving), how treatment may affect contraction and pregnancy, death related to seizures/SUDEP, seizure 1st aid and risks. Patient and  are educated on seizure precautions, including no driving, no operating heavy machinery, no unsupervised swimming or bathing, no unsupervised use of stove, no climbing heights, and no unsupervised use of sharp objects.

## 2020-09-10 ENCOUNTER — APPOINTMENT (OUTPATIENT)
Dept: NEUROLOGY | Facility: CLINIC | Age: 40
End: 2020-09-10
Payer: MEDICAID

## 2020-09-10 VITALS
BODY MASS INDEX: 26.66 KG/M2 | DIASTOLIC BLOOD PRESSURE: 74 MMHG | HEIGHT: 65 IN | TEMPERATURE: 98.1 F | WEIGHT: 160 LBS | HEART RATE: 71 BPM | OXYGEN SATURATION: 99 % | SYSTOLIC BLOOD PRESSURE: 111 MMHG

## 2020-09-10 PROCEDURE — 99214 OFFICE O/P EST MOD 30 MIN: CPT

## 2020-09-10 RX ORDER — MULTIVITAMIN WITH FOLIC ACID 400 MCG
TABLET ORAL
Qty: 30 | Refills: 0 | Status: DISCONTINUED | COMMUNITY
Start: 2018-03-25 | End: 2020-09-10

## 2020-09-10 RX ORDER — FOLIC ACID 1 MG/1
1 TABLET ORAL
Qty: 360 | Refills: 0 | Status: DISCONTINUED | COMMUNITY
Start: 2018-03-25 | End: 2020-09-10

## 2020-09-10 RX ORDER — ATORVASTATIN CALCIUM 10 MG/1
10 TABLET, FILM COATED ORAL
Qty: 30 | Refills: 0 | Status: DISCONTINUED | COMMUNITY
Start: 2018-02-20 | End: 2020-09-10

## 2020-09-10 RX ORDER — MELOXICAM 15 MG/1
15 TABLET ORAL DAILY
Qty: 30 | Refills: 0 | Status: DISCONTINUED | COMMUNITY
Start: 2018-05-21 | End: 2020-09-10

## 2020-09-10 RX ORDER — HYDROQUINONE 40 MG/G
4 CREAM TOPICAL TWICE DAILY
Qty: 1 | Refills: 2 | Status: DISCONTINUED | COMMUNITY
Start: 2019-08-13 | End: 2020-09-10

## 2020-09-10 NOTE — HISTORY OF PRESENT ILLNESS
[FreeTextEntry1] : LAST CLINIC VISIT: 8/13/20\par \par PCP: Dr. Brianne Benson\par  nickname: Nora De La Cruz)\par \par INTERIM HISTORY:\par No seizure activity noted since before last visit on 8/13/20. She reports that she had to stop taking the clobazam she was prescribed at her last visit due to the excessive drowsiness it caused. Tired on 5mg qHS; intolerable at 10mg qHS. She reports feeling well today and that her sleep has improved. Pending elective EMU next week.\par \par CURRENT AEDs:\par LEV ER 1500mg BID - started 2015; level 51.4 on 8/13/20\par LCM 200mg BID - started 7/2018; level 15.6 on 8/13/20\par CZP ODT 1mg tabs prn - started 8/2018\par \par \par PRIOR EPILEPSY HISTORY:\par 8/7/18: This is a 38 year-old RH [place] female with no significant PMH who is self referred to establish care for epilepsy. Pt today is accompanied by her . Pt was previously followed by neurologist Dr. Chuck Davis. Pt's  provided most of history since pt speaks limited English.\par \par Onset of sz at age 25, during the 3rd month of pregnancy. Pt was in [facility] at the time. She was not started on AED, nor was she started on AED after her second sz at 9th month of pregnancy. After delivery, pt was started on CBZ. She was sz free on CBZ approximately from 9159-0901. Doctor in [facility] tapered her off of CBZ. When pt unfortunately sustained another sz, she was put back on CBZ. \par \par [place] around [place]. At the time, she was planning for second pregnancy, so CBZ was switched to LEV 500mg BID. Next seizure occurred on 8/2016, likely triggered by stress. LEV was increased 1gm BID. When pt was sz free for several years on this dosing, LEV was decreased back to 500mg BID. Unfortunately, with lowered dose of LEV, sz recurred on 7/14/18, while pt was in [place]. She was admitted to [facility]. Head imaging found innumerable lesions throughout R supratentorial hemisphere without focal enhancement, edema, or mass effect. Ddx: cav mal, infectious, less likely Sturge Nicolas or petechial hemorrhage. MRA H/N and cerebral angiogram were unremarkable. Pt and  were not aware of this finding. At discharge, LCM 50mg BID was added to LEV 500mg BID.\par \par === 11/8/18 ===\par Pt today is accompanied by her . Clarified that she is really just only LEV 500mg BID (not 1gm BID) and LCM 50mg BID. She was sz free for several yrs on LEV 1gm BID. Sz recurred after LEV was lowered to 500mg BID. Has not had any further sz since last visit. CURRENT AEDs: LEV 500mg BID - started 2015; LCM 50mg BID - started 7/2018; CLZ ODT 0.5mg prn - started 8/2018.\par \par === 2/19/19 ===\par Pt today is accompanied by her . At the last visit, plan was to cross titrate LEV 500mg BID and LCM 50mg BID to LEV 1gm BID. On 11/13, pt was on LEV 1gm BID and LCM 50mg qHS when she had a typical aura. Pt took CLZ ODT. On 12/24, pt was only on LEV 1gm BID and again had another aura, but this time was very mild. She again took CLZ ODT and called the on-call epilepsy fellow, who instructed pt to go back to LEV 500mg BID and LCM 50mg BID. \par \par Pt also saw Dr. 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 Offered cerebral angiography, but pt said she had it done in [place]. She will be bringing the imaging to Dr. Santo once she obtains a copy. Will f/u Dr. Santo and repeat image in a yr. CURRENT AEDs: LEV 500mg BID - started 2015; LCM 50mg BID - started 7/2018; CLZ ODT 0.5mg prn - started 8/2018.\par \par === 6/3/19 ===\par Pt today is accompanied by her . At the last visit, planned to taper off LCM 50mg BID and increase LEV from 500mg BID to 2000mg BID. Three to four days after coming off of LCM, pt had a breakthrough seizure, needed CZP ODT 1mg x2. Pt now on LCM 50mg BID and LEV 1000mg BID. No further sz on this duo-therapy. CURRENT AEDs: LEV 1000mg BID - started 2015, LCM 50mg BID - started 7/2018, CLZ ODT 1mg tabs prn - started 8/2018.\par \par === 9/11/19 ===\par Since last visit, pt has had 4 seizures that started with buzzing noise, visual aura in L eye, nausea and tachycardia. All of which were aborted with CZP ODT. CURRENT AEDs: LEV 1000mg BID - started 2015\par LCM 50mg BID - started 7/2018; CLZ ODT 1mg tabs prn - started 8/2018.\par \par === 11/6/19 ===\par Pt today is accompanied by her . At the last visit, increased LCM from 50mg BID to 100mg BID, which pt did. She was asked to change LEV IR 1gm BID to ER 1gm BID, which pt did not do. Hasn't had any sz since last visit. Saw Dr. Santo; awaiting MRI brain w/wo. CURRENT AEDs: LEV IR 1000mg BID - started 2015; LCM 100mg BID - started 7/2018; CLZ ODT 1mg tabs prn - started 8/2018.\par \par === 1/8/20 ===\par Pt today is accompanied by her . Since the last visit, has had two visual aura (seeing stars) around menstrual cycle, aborted with CZP. Sz threshold lowered by sleep deprivation. Hasn't gotten blood level of AEDs as instructed at the last visit. Still taking LEV IR and not ER because pt does not want to toss the remaining IR tablets; has a month of supply left. Saw Dr. Santo. MRI 11/27/19 stable compared to 12/8/18. No evidence for edema or hemorrhage. Repeat MRI in a yr. CURRENT AEDs: LEV IR 1000mg BID - started 2015; LCM 100mg BID - started 7/2018; CLZ ODT 1mg tabs prn - started 8/2018.\par \par === 6/2/20 ===\par Had a very small aura early 4/2020, aborted with CLZ x2. LCM 150mg BID increased to 200mg BID by Dr. Navarro on 5/5. No further sz. Pt and  expressed that they are no longer actively trying to conceive. CURRENT AEDs: LEV ER 1000mg BID - started 2015; LCM 200mg BID - started 7/2018; CLZ ODT 1mg tabs prn - started 8/2018.\par \par ===8/13/20===\par Increased LEV from 1000mg BID to 1500mg BID at the last visit. Had two focal seizures, aborted with CZP. Most breakthrough seizures occur around menses.\par \par SEIZURE DESCRIPTION AND TYPE:\par Type #1\par Severity: focal sz, both aware and impaired (unclear if progresses to true BL tonic clonic sz)\par Onset: 24yo\par Quality & associated signs/symptoms: Prolonged aura of nausea +/- vomiting, geographic shapes (eg. stars, bubbles) in her L visual field -> loss of awareness, head turning/jerking toward the L, tonic-clonic activity in L arm and leg -> postictal confusion, L Miguel Ángel's. Pt developed postictal psychosis (auditory hallucination, paranoia) once after a sz. 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 Patient was a product of normal pregnancy and delivery. No history of febrile seizure, TBI, CNS infection, or FH of seizures.\par \par PREVIOUS AEDs:\par CBZ - started 24yo, stopped ~2015 because pt was planning for pregnancy\par \par IMAGING: \par MRI w/wo 11/27/19 ([facility]): Right cerebral hemisphere slightly smaller than the left. No change since 12/7/2018. 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 \par \par MRI w/wo 12/8/18 ([facility]): Multiple foci of T2 signal abnormalities associated with serpiginous vessels as described, which may represent cavernomatosis with associated DVA's. Lesions are unusually hemispheric as no lesions are seen within the left cerebral hemisphere. While findings may represent familial versus sporadic cavernomatosis, radiation induced cavernomatosis in the appropriate clinical setting may be considered. Other differential diagnoses of vascular malformations are not excluded. Findings are not consistent with neurosarcoidosis or lymphoproliferative abnormality. \par \par CTH 7/15/18 ([place], [place]): Gyriform calcifications in the R temporooccipital regions. Multiple subcentimeter hyperdense lesions in the R cerebral hemisphere. No significant edema or midline shift. \par \par MRI w/wo 7/14/18 ([place], [place]): Innumerable lesions throughout R supratentorial hemisphere without evidence of edema or mass effect. Diffuse mild enhancement throughout R supratentorial hemisphere. Ddx: cav mal, infectious, less likely Sturge Nicolas or petechial hemorrhage.\par \par MRA H/N w/wo 7/15/18 ([facility], [place]): normal\par \par Cerebral angiogram 7/17/18 ([place], [place]): no aneurysm or AVM \par \par NEUROPHYSIOLOGY:\par cvEEG 7/16 - 7/17/18 ([place], [place]): normal awake and asleep, no IED\par \par NEUROPSYCHOLOGY: \par none

## 2020-09-10 NOTE — CONSULT LETTER
[Dear  ___] : Dear  [unfilled], [Sincerely,] : Sincerely, [FreeTextEntry1] : I had the pleasure of seeing your patient, JERRY BUTCHER, in my office today. Please see my note below. \par \par If you have any questions, please do not hesitate to contact me.  [FreeTextEntry3] : Lluvia Betancourt MD\par [facility]\par [facility] [place]\par [address] [place] [address]\par Phone: [phone]; Fax: [phone]\par \par

## 2020-09-10 NOTE — REVIEW OF SYSTEMS
[Fever] : no fever [Chills] : no chills [Confused or Disoriented] : no confusion [Memory Lapses or Loss] : no memory loss [Decr. Concentrating Ability] : no decrease in concentrating ability [Difficulty with Language] : no ~M difficulty with language [Facial Weakness] : no facial weakness [Arm Weakness] : no arm weakness [Hand Weakness] : no hand weakness [Leg Weakness] : no leg weakness [Poor Coordination] : good coordination [Numbness] : no numbness [Tingling] : no tingling [Dizziness] : no dizziness [Fainting] : no fainting [Lightheadedness] : no lightheadedness [Difficulty Walking] : no difficulty walking [Inability to Walk] : able to walk [Ataxia] : no ataxia [Frequent Falls] : not falling [Anxiety] : no anxiety [Depression] : no depression [Eyesight Problems] : no eyesight problems [Loss Of Hearing] : no hearing loss [Chest Pain] : no chest pain [Palpitations] : no palpitations [Shortness Of Breath] : no shortness of breath [Cough] : no cough [Abdominal Pain] : no abdominal pain [Constipation] : no constipation [Diarrhea] : no diarrhea [Dysuria] : no dysuria [Incontinence] : no incontinence [Joint Swelling] : no joint swelling [Joint Stiffness] : no joint stiffness [Skin Lesions] : no skin lesions [Skin Wound] : no skin wound [Muscle Weakness] : no muscle weakness [Easy Bleeding] : no tendency for easy bleeding [Easy Bruising] : no tendency for easy bruising [de-identified] : Reports occasional mild headache on right side of her head when when waking up in morning, relieved with Tylenol.\par

## 2020-09-10 NOTE — PHYSICAL EXAM
[General Appearance - Alert] : alert [General Appearance - Well Developed] : well developed [General Appearance - In No Acute Distress] : in no acute distress [General Appearance - Well Nourished] : well nourished [General Appearance - Well-Appearing] : healthy appearing [Oriented To Time, Place, And Person] : oriented to person, place, and time [Impaired Insight] : insight and judgment were intact [Mood] : the mood was normal [Affect] : the affect was normal [Person] : oriented to person [Place] : oriented to place [Time] : oriented to time [Short Term Intact] : short term memory intact [Fluency] : fluency intact [Naming Objects] : no difficulty naming common objects [Concentration Intact] : normal concentrating ability [Comprehension] : comprehension intact [Current Events] : adequate knowledge of current events [Cranial Nerves Optic (II)] : visual acuity intact bilaterally,  visual fields full to confrontation, pupils equal round and reactive to light [Past History] : adequate knowledge of personal past history [Cranial Nerves Oculomotor (III)] : extraocular motion intact [Cranial Nerves Trigeminal (V)] : facial sensation intact symmetrically [Cranial Nerves Vestibulocochlear (VIII)] : hearing was intact bilaterally [Cranial Nerves Facial (VII)] : face symmetrical [Cranial Nerves Glossopharyngeal (IX)] : tongue and palate midline [Cranial Nerves Accessory (XI - Cranial And Spinal)] : head turning and shoulder shrug symmetric [Cranial Nerves Hypoglossal (XII)] : there was no tongue deviation with protrusion [Motor Strength] : muscle strength was normal in all four extremities [Sensation Tactile Decrease] : light touch was intact [Balance] : balance was intact [2+] : Patella left 2+ [Sclera] : the sclera and conjunctiva were normal [PERRL With Normal Accommodation] : pupils were equal in size, round, reactive to light, with normal accommodation [Extraocular Movements] : extraocular movements were intact [Full Visual Field] : full visual field [Outer Ear] : the ears and nose were normal in appearance [Neck Appearance] : the appearance of the neck was normal [Hearing Threshold Finger Rub Not Gaston] : hearing was normal [] : no respiratory distress [Respiration, Rhythm And Depth] : normal respiratory rhythm and effort [Edema] : there was no peripheral edema [Heart Rate And Rhythm] : heart rate was normal and rhythm regular [Abnormal Walk] : normal gait [Motor Tone] : muscle strength and tone were normal [Skin Color & Pigmentation] : normal skin color and pigmentation [Paresis Pronator Drift Right-Sided] : no pronator drift on the right [Paresis Pronator Drift Left-Sided] : no pronator drift on the left [Motor Strength Upper Extremities Bilaterally] : strength was normal in both upper extremities [Motor Strength Lower Extremities Bilaterally] : strength was normal in both lower extremities [Limited Balance] : balance was intact [Tremor] : no tremor present [Dysdiadochokinesia Bilaterally] : not present [Coordination - Dysmetria Impaired Finger-to-Nose Bilateral] : not present

## 2020-09-10 NOTE — ASSESSMENT
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 Did not tolerate CLB due to excessive drowsiness. Instructed that if pt were to have a breakthrough sz, restart CLB but at 0.25mg qHS. Pending EMU next week. All questions and concerns answered and addressed in detail to pt and 's complete satisfaction. Patient and her  verbalized understanding and agreed to plan.\par \par \par - Admit to EMU. The purpose of the EMU admission is for identification and localization of a seizure focus in a patient with intractable epilepsy who is being considered for surgery. The expected length of stay is 5-6 days.\par - if more sz: restart CLB at 0.25mg qHS x1-2wks -> 0.5mg qHS\par - continue LEV ER 1500mg BID\par - continue LCM 200mg BID \par - cont CLZ ODT 1mg prn aura\par - cont folic acid 4mg daily (prefers to stay on 4mg even though no plan to conceive)\par - f/u Dr. Santo for cav mal's\par - pt not driving\par - f/u in 1 month\par \par \par All relevant epilepsy AAN quality care measures were addressed and discussed with the patient and her .\par \par More than 50% of time spent counseling and educating patient and her  about epilepsy specific safety issues including AED side effects and interactions, alcohol consumption, sleep deprivation, risks and driving privileges associated with the [place] Guidelines (pt not driving), how treatment may affect contraction and pregnancy, death related to seizures/SUDEP, seizure 1st aid and risks. Patient and  are educated on seizure precautions, including no driving, no operating heavy machinery, no unsupervised swimming or bathing, no unsupervised use of stove, no climbing heights, and no unsupervised use of sharp objects.

## 2020-09-11 ENCOUNTER — APPOINTMENT (OUTPATIENT)
Dept: DISASTER EMERGENCY | Facility: CLINIC | Age: 40
End: 2020-09-11

## 2020-09-11 ENCOUNTER — LABORATORY RESULT (OUTPATIENT)
Age: 40
End: 2020-09-11

## 2020-10-09 ENCOUNTER — APPOINTMENT (OUTPATIENT)
Dept: NEUROLOGY | Facility: CLINIC | Age: 40
End: 2020-10-09
Payer: MEDICAID

## 2020-10-09 ENCOUNTER — RECORD ABSTRACTING (OUTPATIENT)
Age: 40
End: 2020-10-09

## 2020-10-09 VITALS
TEMPERATURE: 98.7 F | HEART RATE: 79 BPM | HEIGHT: 65 IN | SYSTOLIC BLOOD PRESSURE: 117 MMHG | WEIGHT: 160 LBS | DIASTOLIC BLOOD PRESSURE: 79 MMHG | OXYGEN SATURATION: 98 % | BODY MASS INDEX: 26.66 KG/M2

## 2020-10-09 PROCEDURE — 99215 OFFICE O/P EST HI 40 MIN: CPT

## 2020-11-12 ENCOUNTER — APPOINTMENT (OUTPATIENT)
Dept: NEUROSURGERY | Facility: CLINIC | Age: 40
End: 2020-11-12
Payer: MEDICAID

## 2020-11-12 PROCEDURE — 99212 OFFICE O/P EST SF 10 MIN: CPT | Mod: 95

## 2020-11-13 NOTE — ASSESSMENT
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 Betancourt as planned

## 2020-11-13 NOTE — REASON FOR VISIT
[Follow-Up: _____] : a [unfilled] follow-up visit [Home] : at home, [unfilled] , at the time of the visit. [Medical Office: ([facility])___] : at the medical office located in  [Spouse] : spouse [Verbal consent obtained from patient] : the patient, [unfilled] [FreeTextEntry1] : \jasvir Delvalle is a 40yr old female right handed here today for a follow up visit. We are monitoring cavernous malformations that she has in the right hemisphere. She did not get any new imaging. SHe is following up with Dr. Betancourt for seizure management. She has EEG planned for this week.

## 2020-12-09 ENCOUNTER — RESULT REVIEW (OUTPATIENT)
Age: 40
End: 2020-12-09

## 2020-12-09 ENCOUNTER — OUTPATIENT (OUTPATIENT)
Dept: OUTPATIENT SERVICES | Facility: HOSPITAL | Age: 40
LOS: 1 days | End: 2020-12-09
Payer: MEDICAID

## 2020-12-09 ENCOUNTER — APPOINTMENT (OUTPATIENT)
Dept: MRI IMAGING | Facility: CLINIC | Age: 40
End: 2020-12-09
Payer: MEDICAID

## 2020-12-09 DIAGNOSIS — Z00.8 ENCOUNTER FOR OTHER GENERAL EXAMINATION: ICD-10-CM

## 2020-12-09 PROCEDURE — A9585: CPT

## 2020-12-09 PROCEDURE — 70553 MRI BRAIN STEM W/O & W/DYE: CPT | Mod: 26

## 2020-12-09 PROCEDURE — 70553 MRI BRAIN STEM W/O & W/DYE: CPT

## 2020-12-14 ENCOUNTER — APPOINTMENT (OUTPATIENT)
Dept: OPHTHALMOLOGY | Facility: CLINIC | Age: 40
End: 2020-12-14

## 2020-12-15 ENCOUNTER — APPOINTMENT (OUTPATIENT)
Dept: DERMATOLOGY | Facility: CLINIC | Age: 40
End: 2020-12-15

## 2021-01-05 ENCOUNTER — APPOINTMENT (OUTPATIENT)
Dept: NEUROLOGY | Facility: CLINIC | Age: 41
End: 2021-01-05
Payer: MEDICAID

## 2021-01-05 VITALS
BODY MASS INDEX: 26.66 KG/M2 | WEIGHT: 160 LBS | HEIGHT: 65 IN | SYSTOLIC BLOOD PRESSURE: 132 MMHG | DIASTOLIC BLOOD PRESSURE: 85 MMHG | OXYGEN SATURATION: 98 % | TEMPERATURE: 97.9 F | HEART RATE: 76 BPM

## 2021-01-05 PROCEDURE — 99072 ADDL SUPL MATRL&STAF TM PHE: CPT

## 2021-01-05 PROCEDURE — 99214 OFFICE O/P EST MOD 30 MIN: CPT

## 2021-01-05 RX ORDER — CLOBAZAM 10 MG/1
10 TABLET ORAL
Qty: 30 | Refills: 0 | Status: DISCONTINUED | COMMUNITY
Start: 2020-08-13 | End: 2021-01-05

## 2021-01-05 NOTE — REVIEW OF SYSTEMS
[Fever] : no fever [Chills] : no chills [Confused or Disoriented] : no confusion [Memory Lapses or Loss] : no memory loss [Decr. Concentrating Ability] : no decrease in concentrating ability [Difficulty with Language] : no ~M difficulty with language [Facial Weakness] : no facial weakness [Arm Weakness] : no arm weakness [Hand Weakness] : no hand weakness [Leg Weakness] : no leg weakness [Poor Coordination] : good coordination [Numbness] : no numbness [Tingling] : no tingling [Dizziness] : no dizziness [Fainting] : no fainting [Lightheadedness] : no lightheadedness [Difficulty Walking] : no difficulty walking [Inability to Walk] : able to walk [Ataxia] : no ataxia [Frequent Falls] : not falling [Anxiety] : no anxiety [Depression] : no depression [Eyesight Problems] : no eyesight problems [Loss Of Hearing] : no hearing loss [Chest Pain] : no chest pain [Palpitations] : no palpitations [Shortness Of Breath] : no shortness of breath [Cough] : no cough [Abdominal Pain] : no abdominal pain [Constipation] : no constipation [Diarrhea] : no diarrhea [Dysuria] : no dysuria [Incontinence] : no incontinence [Joint Swelling] : no joint swelling [Joint Stiffness] : no joint stiffness [Skin Lesions] : no skin lesions [Skin Wound] : no skin wound [Muscle Weakness] : no muscle weakness [Easy Bleeding] : no tendency for easy bleeding [Easy Bruising] : no tendency for easy bruising [de-identified] : \par

## 2021-01-05 NOTE — HISTORY OF PRESENT ILLNESS
[FreeTextEntry1] : LAST CLINIC VISIT: 9/10/20\par PCP: Dr. Brianne Benson\par  nickname: Nora (Phill)\par \par INTERIM HISTORY:\par Pt today accompanied by . Did not show up for EMU due to family emergency. Tried CLB at 0.25mg, but remains very sleepy. Had one focal aware sz since last visit. \par \par CURRENT AEDs:\par LEV ER 1500mg BID - started 2015; level 51.4 on 8/13/20\par LCM 200mg BID - started 7/2018; level 15.6 on 8/13/20\par CZP ODT 1mg tabs prn - started 8/2018\par \par \par PRIOR EPILEPSY HISTORY:\par 8/7/18: This is a 38 year-old RH Haitian female with no significant PMH who is self referred to establish care for epilepsy. Pt today is accompanied by her . Pt was previously followed by neurologist Dr. Chuck Davis. Pt's  provided most of history since pt speaks limited English.\par \par Onset of sz at age 25, during the 3rd month of pregnancy. Pt was in [facility] at the time. She was not started on AED, nor was she started on AED after her second sz at 9th month of pregnancy. After delivery, pt was started on CBZ. She was sz free on CBZ approximately from [id number]. Doctor in [facility] tapered her off of CBZ. When pt unfortunately sustained another sz, she was put back on CBZ. \par \par Pt immig[place] to the [place] around [place]. At the time, she was planning for second pregnancy, so CBZ was switched to LEV 500mg BID. Next seizure occurred on 8/2016, likely triggered by stress. LEV was increased 1gm BID. When pt was sz free for several years on this dosing, LEV was decreased back to 500mg BID. Unfortunately, with lowered dose of LEV, sz recurred on 7/14/18, while pt was in [place]. She was admitted to [facility]. Head imaging found innumerable lesions throughout R supratentorial hemisphere without focal enhancement, edema, or mass effect. Ddx: cav mal, infectious, less likely Sturge Nicolas or petechial hemorrhage. MRA H/N and cerebral angiogram were unremarkable. Pt and  were not aware of this finding. At discharge, LCM 50mg BID was added to LEV 500mg BID.\par \par === 11/8/18 ===\par Pt today is accompanied by her . Clarified that she is really just only LEV 500mg BID (not 1gm BID) and LCM 50mg BID. She was sz free for several yrs on LEV 1gm BID. Sz recurred after LEV was lowered to 500mg BID. Has not had any further sz since last visit. CURRENT AEDs: LEV 500mg BID - started 2015; LCM 50mg BID - started 7/2018; CLZ ODT 0.5mg prn - started 8/2018.\par \par === 2/19/19 ===\par Pt today is accompanied by her . At the last visit, plan was to cross titrate LEV 500mg BID and LCM 50mg BID to LEV 1gm BID. On 11/13, pt was on LEV 1gm BID and LCM 50mg qHS when she had a typical aura. Pt took CLZ ODT. On 12/24, pt was only on LEV 1gm BID and again had another aura, but this time was very mild. She again took CLZ ODT and called the on-call epilepsy fellow, who instructed pt to go back to LEV 500mg BID and LCM 50mg BID. \par \par Pt also saw Dr. 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 Offered cerebral angiography, but pt said she had it done in [place]. She will be bringing the imaging to Dr. Santo once she obtains a copy. Will f/u Dr. Santo and repeat image in a yr. CURRENT AEDs: LEV 500mg BID - started 2015; LCM 50mg BID - started 7/2018; CLZ ODT 0.5mg prn - started 8/2018.\par \par === 6/3/19 ===\par Pt today is accompanied by her . At the last visit, planned to taper off LCM 50mg BID and increase LEV from 500mg BID to 2000mg BID. Three to four days after coming off of LCM, pt had a breakthrough seizure, needed CZP ODT 1mg x2. Pt now on LCM 50mg BID and LEV 1000mg BID. No further sz on this duo-therapy. CURRENT AEDs: LEV 1000mg BID - started 2015, LCM 50mg BID - started 7/2018, CLZ ODT 1mg tabs prn - started 8/2018.\par \par === 9/11/19 ===\par Since last visit, pt has had 4 seizures that started with buzzing noise, visual aura in L eye, nausea and tachycardia. All of which were aborted with CZP ODT. CURRENT AEDs: LEV 1000mg BID - started 2015\par LCM 50mg BID - started 7/2018; CLZ ODT 1mg tabs prn - started 8/2018.\par \par === 11/6/19 ===\par Pt today is accompanied by her . At the last visit, increased LCM from 50mg BID to 100mg BID, which pt did. She was asked to change LEV IR 1gm BID to ER 1gm BID, which pt did not do. Hasn't had any sz since last visit. Saw Dr. Santo; awaiting MRI brain w/wo. CURRENT AEDs: LEV IR 1000mg BID - started 2015; LCM 100mg BID - started 7/2018; CLZ ODT 1mg tabs prn - started 8/2018.\par \par === 1/8/20 ===\par Pt today is accompanied by her . Since the last visit, has had two visual aura (seeing stars) around menstrual cycle, aborted with CZP. Sz threshold lowered by sleep deprivation. Hasn't gotten blood level of AEDs as instructed at the last visit. Still taking LEV IR and not ER because pt does not want to toss the remaining IR tablets; has a month of supply left. Saw Dr. Santo. MRI 11/27/19 stable compared to 12/8/18. No evidence for edema or hemorrhage. Repeat MRI in a yr. CURRENT AEDs: LEV IR 1000mg BID - started 2015; LCM 100mg BID - started 7/2018; CLZ ODT 1mg tabs prn - started 8/2018.\par \par === 6/2/20 ===\par Had a very small aura early 4/2020, aborted with CLZ x2. LCM 150mg BID increased to 200mg BID by Dr. Navarro on 5/5. No further sz. Pt and  expressed that they are no longer actively trying to conceive. CURRENT AEDs: LEV ER 1000mg BID - started 2015; LCM 200mg BID - started 7/2018; CLZ ODT 1mg tabs prn - started 8/2018.\par \par ===8/13/20===\par Increased LEV from 1000mg BID to 1500mg BID at the last visit. Had two focal seizures, aborted with CZP. Most breakthrough seizures occur around menses.\par \par ===9/10/20===\par No seizure activity noted since before last visit on 8/13/20. She reports that she had to stop taking the clobazam she was prescribed at her last visit due to the excessive drowsiness it caused. Tired on 5mg qHS; intolerable at 10mg qHS. She reports feeling well today and that her sleep has improved. Pending elective EMU next week. CURRENT AEDs: LEV ER 1500mg BID, LCM 200mg BID, CZP ODT 1mg tabs prn - started 8/2018.\par \par \par SEIZURE DESCRIPTION AND TYPE:\par Type #1\par Severity: focal sz, both aware and impaired (unclear if progresses to true BL tonic clonic sz)\par Onset: 26yo\par Quality & associated signs/symptoms: Prolonged aura of nausea +/- vomiting, geographic shapes (eg. stars, bubbles) in her L visual field -> loss of awareness, head turning/jerking toward the L, tonic-clonic activity in L arm and leg -> postictal confusion, L Miguel Ángel's. Pt developed postictal psychosis (auditory hallucination, paranoia) once after a sz. 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 Patient was a product of normal pregnancy and delivery. No history of febrile seizure, TBI, CNS infection, or FH of seizures.\par \par PREVIOUS AEDs:\par CBZ - started 26yo, stopped ~2015 because pt was planning for pregnancy\par CLB 0.25mg qHS - very briefly 9/2020, sleepy\par \par IMAGING: \par MRI w/wo 11/27/19 ([facility]): Right cerebral hemisphere slightly smaller than the left. No change since 12/7/2018. 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 \par \par MRI w/wo 12/8/18 ([facility]): Multiple foci of T2 signal abnormalities associated with serpiginous vessels as described, which may represent cavernomatosis with associated DVA's. Lesions are unusually hemispheric as no lesions are seen within the left cerebral hemisphere. While findings may represent familial versus sporadic cavernomatosis, radiation induced cavernomatosis in the appropriate clinical setting may be considered. Other differential diagnoses of vascular malformations are not excluded. Findings are not consistent with neurosarcoidosis or lymphoproliferative abnormality. \par \par CTH 7/15/18 ([place], [place]): Gyriform calcifications in the R temporooccipital regions. Multiple subcentimeter hyperdense lesions in the R cerebral hemisphere. No significant edema or midline shift. \par \par MRI w/wo 7/14/18 ([place], [place]): Innumerable lesions throughout R supratentorial hemisphere without evidence of edema or mass effect. Diffuse mild enhancement throughout R supratentorial hemisphere. Ddx: cav mal, infectious, less likely Sturge Nicolas or petechial hemorrhage.\par \par MRA H/N w/wo 7/15/18 ([place], [place]): normal\par \par Cerebral angiogram 7/17/18 ([place], [place]): no aneurysm or AVM \par \par NEUROPHYSIOLOGY:\par cvEEG 7/16 - 7/17/18 ([place], [place]): normal awake and asleep, no IED\par \par NEUROPSYCHOLOGY: \par none

## 2021-01-05 NOTE — CONSULT LETTER
[FreeTextEntry1] : I had the pleasure of seeing your patient, JERRY BUTCHER, in my office today. Please see my note below. \par \par If you have any questions, please do not hesitate to contact me.  [FreeTextEntry3] : Lluvia Betancourt MD\par [facility]\par [facility] [place]\par [address] [place] [address]\par Phone: [phone]; Fax: [phone]\par \par

## 2021-01-05 NOTE — ASSESSMENT
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 Could not tolerate CLB. Will start LTG. Pending EMU to attempt to localize symptomatic zone giving rise to seizures. All questions and concerns answered and addressed in detail to pt and 's complete satisfaction. Patient and her  verbalized understanding and agreed to plan.\par \par \par - Admit to EMU. The purpose of the EMU admission is for identification and localization of a seizure focus in a patient with intractable epilepsy who is being considered for surgery. The expected length of stay is 5-6 days.\par \par - Start LTG. Following titration schedule printed and provided to pt. Educated pt on side effects, including monitoring for rash daily. Stop taking LTG and call clinic immediately if rash appears. \par Week 1, 2: 0/25mg		\par Week 3, 4: 25/25mg\par Week 5, 6: 25/50mg\par Week 7, 8: 50/50mg\par Week 9, 10: 50/100mg\par Week 11 and onward: 100/100mg\par \par - continue LEV ER 1500mg BID\par - continue LCM 200mg BID \par - cont CLZ ODT 1mg prn aura\par - cont folic acid 4mg daily (prefers to stay on 4mg even though no plan to conceive)\par - f/u Dr. Santo for cav mal's\par - pt not driving\par - f/u after EMU\par \par \par All relevant epilepsy AAN quality care measures were addressed and discussed with the patient and her .\par \par More than 50% of time spent counseling and educating patient and her  about epilepsy specific safety issues including AED side effects and interactions, alcohol consumption, sleep deprivation, risks and driving privileges associated with the [facility] Guidelines (pt not driving), how treatment may affect contraction and pregnancy, death related to seizures/SUDEP, seizure 1st aid and risks. Patient and  are educated on seizure precautions, including no driving, no operating heavy machinery, no unsupervised swimming or bathing, no unsupervised use of stove, no climbing heights, and no unsupervised use of sharp objects.

## 2021-01-07 ENCOUNTER — APPOINTMENT (OUTPATIENT)
Dept: NEUROSURGERY | Facility: CLINIC | Age: 41
End: 2021-01-07
Payer: MEDICAID

## 2021-01-07 PROCEDURE — 99441: CPT

## 2021-01-07 NOTE — CONSULT LETTER
[Dear  ___] : Dear  [unfilled], [Sincerely,] : Sincerely, [FreeTextEntry1] : I had the pleasure of seeing your patient, JERRY BUTCHER, in my office today. Please see my note below. \par \par If you have any questions, please do not hesitate to contact me.  [FreeTextEntry3] : Lluvia Betancourt MD\par [facility]\par [facility] [place]\par [address] [place] [address]\par Phone: [phone]; Fax: [phone]\par \par

## 2021-01-07 NOTE — HISTORY OF PRESENT ILLNESS
[FreeTextEntry1] : LAST OFFICE VISIT: 10/9/20\par \par PCP: Dr. Brianne Benson\par  nickname: Nora De La Cruz)\par \par INTERIM HISTORY:\par Pt today accompanied by . At the last visit, started on LTG; tolerating well. Had one aura on 11/26, while on a low dose of LTG at 50mg BID, but no further sz since. Reports isolated right sided tinnitus without any other sx’s (not habitual aura). Cancelled elective EMU due to concern for COVID.\par \par Saw Dr. Santo in Nov, who ordered repeat MRI w/wo 12/9, demonstrating stable right panhemispheric cav mal without significant change from prior images.\par \par CURRENT AEDs:\par LEV ER 1500mg BID - started 2015; level 51.4 on 8/13/20\par LCM 200mg BID - started 7/2018; level 15.6 on 8/13/20\par LTG 100mg BID – started 10/2020\par CZP ODT 1mg tabs prn - started 8/2018\par \par \par PRIOR EPILEPSY HISTORY:\par 8/7/18: This is a 38 year-old RH American female with no significant PMH who is self referred to establish care for epilepsy. Pt today is accompanied by her . Pt was previously followed by neurologist Dr. Chuck Davis. Pt's  provided most of history since pt speaks limited English.\par \par Onset of sz at age 25, during the 3rd month of pregnancy. Pt was in [facility] at the time. She was not started on AED, nor was she started on AED after her second sz at 9th month of pregnancy. After delivery, pt was started on CBZ. She was sz free on CBZ approximately from [id number]. Doctor in [facility] tapered her off of CBZ. When pt unfortunately sustained another sz, she was put back on CBZ. \par \par Pt immig[place] to the [place] around [place]. At the time, she was planning for second pregnancy, so CBZ was switched to LEV 500mg BID. Next seizure occurred on 8/2016, likely triggered by stress. LEV was increased 1gm BID. When pt was sz free for several years on this dosing, LEV was decreased back to 500mg BID. Unfortunately, with lowered dose of LEV, sz recurred on 7/14/18, while pt was in [place]. She was admitted to [facility]. Head imaging found innumerable lesions throughout R supratentorial hemisphere without focal enhancement, edema, or mass effect. Ddx: cav mal, infectious, less likely Sturge Nicolas or petechial hemorrhage. MRA H/N and cerebral angiogram were unremarkable. Pt and  were not aware of this finding. At discharge, LCM 50mg BID was added to LEV 500mg BID.\par \par === 11/8/18 ===\par Pt today is accompanied by her . Clarified that she is really just only LEV 500mg BID (not 1gm BID) and LCM 50mg BID. She was sz free for several yrs on LEV 1gm BID. Sz recurred after LEV was lowered to 500mg BID. Has not had any further sz since last visit. CURRENT AEDs: LEV 500mg BID - started 2015; LCM 50mg BID - started 7/2018; CLZ ODT 0.5mg prn - started 8/2018.\par \par === 2/19/19 ===\par Pt today is accompanied by her . At the last visit, plan was to cross titrate LEV 500mg BID and LCM 50mg BID to LEV 1gm BID. On 11/13, pt was on LEV 1gm BID and LCM 50mg qHS when she had a typical aura. Pt took CLZ ODT. On 12/24, pt was only on LEV 1gm BID and again had another aura, but this time was very mild. She again took CLZ ODT and called the on-call epilepsy fellow, who instructed pt to go back to LEV 500mg BID and LCM 50mg BID. \par \par Pt also saw Dr. 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 Offered cerebral angiography, but pt said she had it done in [place]. She will be bringing the imaging to Dr. Santo once she obtains a copy. Will f/u Dr. Santo and repeat image in a yr. CURRENT AEDs: LEV 500mg BID - started 2015; LCM 50mg BID - started 7/2018; CLZ ODT 0.5mg prn - started 8/2018.\par \par === 6/3/19 ===\par Pt today is accompanied by her . At the last visit, planned to taper off LCM 50mg BID and increase LEV from 500mg BID to 2000mg BID. Three to four days after coming off of LCM, pt had a breakthrough seizure, needed CZP ODT 1mg x2. Pt now on LCM 50mg BID and LEV 1000mg BID. No further sz on this duo-therapy. CURRENT AEDs: LEV 1000mg BID - started 2015, LCM 50mg BID - started 7/2018, CLZ ODT 1mg tabs prn - started 8/2018.\par \par === 9/11/19 ===\par Since last visit, pt has had 4 seizures that started with buzzing noise, visual aura in L eye, nausea and tachycardia. All of which were aborted with CZP ODT. CURRENT AEDs: LEV 1000mg BID - started 2015\par LCM 50mg BID - started 7/2018; CLZ ODT 1mg tabs prn - started 8/2018.\par \par === 11/6/19 ===\par Pt today is accompanied by her . At the last visit, increased LCM from 50mg BID to 100mg BID, which pt did. She was asked to change LEV IR 1gm BID to ER 1gm BID, which pt did not do. Hasn't had any sz since last visit. Saw Dr. Santo; awaiting MRI brain w/wo. CURRENT AEDs: LEV IR 1000mg BID - started 2015; LCM 100mg BID - started 7/2018; CLZ ODT 1mg tabs prn - started 8/2018.\par \par === 1/8/20 ===\par Pt today is accompanied by her . Since the last visit, has had two visual aura (seeing stars) around menstrual cycle, aborted with CZP. Sz threshold lowered by sleep deprivation. Hasn't gotten blood level of AEDs as instructed at the last visit. Still taking LEV IR and not ER because pt does not want to toss the remaining IR tablets; has a month of supply left. Saw Dr. Santo. MRI 11/27/19 stable compared to 12/8/18. No evidence for edema or hemorrhage. Repeat MRI in a yr. CURRENT AEDs: LEV IR 1000mg BID - started 2015; LCM 100mg BID - started 7/2018; CLZ ODT 1mg tabs prn - started 8/2018.\par \par === 6/2/20 ===\par Had a very small aura early 4/2020, aborted with CLZ x2. LCM 150mg BID increased to 200mg BID by Dr. Navarro on 5/5. No further sz. Pt and  expressed that they are no longer actively trying to conceive. CURRENT AEDs: LEV ER 1000mg BID - started 2015; LCM 200mg BID - started 7/2018; CLZ ODT 1mg tabs prn - started 8/2018.\par \par ===8/13/20===\par Increased LEV from 1000mg BID to 1500mg BID at the last visit. Had two focal seizures, aborted with CZP. Most breakthrough seizures occur around menses.\par \par ===9/10/20===\par No seizure activity noted since before last visit on 8/13/20. She reports that she had to stop taking the clobazam she was prescribed at her last visit due to the excessive drowsiness it caused. Tired on 5mg qHS; intolerable at 10mg qHS. She reports feeling well today and that her sleep has improved. Pending elective EMU next week. CURRENT AEDs: LEV ER 1500mg BID, LCM 200mg BID, CZP ODT 1mg tabs prn - started 8/2018.\par \par === 10/9/20 ===\par Pt today accompanied by . Did not show up for EMU due to family emergency. Tried CLB at 0.25mg, but remains very sleepy. Had one focal aware sz since last visit. CURRENT AEDs: LEV ER 1500mg BID - started 2015; level 51.4 on 8/13/20; LCM 200mg BID - started 7/2018; level 15.6 on 8/13/20; CZP ODT 1mg tabs prn - started 8/2018.\par \par \par SEIZURE DESCRIPTION AND TYPE:\par Type #1\par Severity: focal sz, both aware and impaired (unclear if progresses to true BL tonic clonic sz)\par Onset: 26yo\par Quality & associated signs/symptoms: Prolonged aura of nausea +/- vomiting, geographic shapes (eg. stars, bubbles) in her L visual field -> loss of awareness, head turning/jerking toward the L, tonic-clonic activity in L arm and leg -> postictal confusion, L Miguel Ángel's. Pt developed postictal psychosis (auditory hallucination, paranoia) once after a sz. 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 Patient was a product of normal pregnancy and delivery. 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 Other punctate cavernous malformations in the septum pellucidum, corpus callosum and infratentorial compartment. 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 No change since 12/7/2018. 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 \par \par MRI w/wo 12/8/18 ([facility]): Multiple foci of T2 signal abnormalities associated with serpiginous vessels as described, which may represent cavernomatosis with associated DVA's. Lesions are unusually hemispheric as no lesions are seen within the left cerebral hemisphere. While findings may represent familial versus sporadic cavernomatosis, radiation induced cavernomatosis in the appropriate clinical setting may be considered. Other differential diagnoses of vascular malformations are not excluded. Findings are not consistent with neurosarcoidosis or lymphoproliferative abnormality. \par \par CTH 7/15/18 ([place], [place]): Gyriform calcifications in the R temporooccipital regions. Multiple subcentimeter hyperdense lesions in the R cerebral hemisphere. No significant edema or midline shift. \par \par MRI w/wo 7/14/18 ([place], [place]): Innumerable lesions throughout R supratentorial hemisphere without evidence of edema or mass effect. Diffuse mild enhancement throughout R supratentorial hemisphere. Ddx: cav mal, infectious, less likely Sturge Nicolas or petechial hemorrhage.\par \par MRA H/N w/wo 7/15/18 ([place], [place]): normal\par \par Cerebral angiogram 7/17/18 ([place], [place]): no aneurysm or AVM \par \par NEUROPHYSIOLOGY:\par cvEEG 7/16 - 7/17/18 ([place], [place]): normal awake and asleep, no IED\par \par NEUROPSYCHOLOGY: \par none

## 2021-01-07 NOTE — ASSESSMENT
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 Santo) and DVAs who presented for followup for focal epilepsy characterized by focal aware and impaired awareness sz's (unclear if progresses to true BL tonic clonic).\par \par Currently doing well after addition of LTG. EMU deferred until COVID-19 pandemic improves per request of pt. If another AED is needed, consider CBZ. All questions and concerns answered and addressed in detail to pt and 's complete satisfaction. Patient and her  verbalized understanding and agreed to plan.\par \par \par - continue LTG 100mg BID\par - continue LEV ER 1500mg BID\par - continue LCM 200mg BID \par - CBC, CMP, LTG/LEV/LCM trough levels\par - cont CLZ ODT 1mg prn aura\par - cont folic acid 4mg daily (prefers to stay on 4mg even though no plan to conceive)\par - f/u Dr. Santo for management of cav mal's\par - pt not driving\par - f/u in April\par \par \par All relevant epilepsy AAN quality care measures were addressed and discussed with the patient and her .\par \par More than 50% of time spent counseling and educating patient and her  about epilepsy specific safety issues including AED side effects and interactions, alcohol consumption, sleep deprivation, risks and driving privileges associated with the [facility] Guidelines (pt not driving), how treatment may affect contraction and pregnancy, death related to seizures/SUDEP, seizure 1st aid and risks. Patient and  are educated on seizure precautions, including no driving, no operating heavy machinery, no unsupervised swimming or bathing, no unsupervised use of stove, no climbing heights, and no unsupervised use of sharp objects.

## 2021-04-05 ENCOUNTER — APPOINTMENT (OUTPATIENT)
Dept: NEUROLOGY | Facility: CLINIC | Age: 41
End: 2021-04-05
Payer: MEDICAID

## 2021-04-05 VITALS
OXYGEN SATURATION: 99 % | HEIGHT: 64 IN | DIASTOLIC BLOOD PRESSURE: 77 MMHG | WEIGHT: 160 LBS | HEART RATE: 83 BPM | BODY MASS INDEX: 27.31 KG/M2 | TEMPERATURE: 98.3 F | SYSTOLIC BLOOD PRESSURE: 121 MMHG

## 2021-04-05 PROCEDURE — 99214 OFFICE O/P EST MOD 30 MIN: CPT

## 2021-04-05 PROCEDURE — 99072 ADDL SUPL MATRL&STAF TM PHE: CPT

## 2021-04-05 RX ORDER — LAMOTRIGINE 100 MG/1
100 TABLET ORAL TWICE DAILY
Qty: 60 | Refills: 5 | Status: COMPLETED | COMMUNITY
Start: 2020-10-09 | End: 2021-04-05

## 2021-04-05 NOTE — ASSESSMENT
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 Self-decreased to 100mg daily. Will change to ER and increase dosage to 150mg daily. EMU deferred until COVID-19 pandemic improves per request of pt. If another AED is needed, consider CBZ vs CNB. All questions and concerns answered and addressed in detail to pt and 's complete satisfaction. Patient and her  verbalized understanding and agreed to plan.\par \par \par - change and increase LTG IR 100mg daily to ER 150mg QHS\par - continue LEV ER 1500mg BID\par - continue LCM 200mg BID \par - cont CLZ ODT 1mg prn aura\par - cont folic acid 4mg daily (prefers to stay on 4mg even though no plan to conceive)\par - pt not driving\par - f/u in 1 month: check labs\par \par \par All relevant epilepsy AAN quality care measures were addressed and discussed with the patient and her .\par \par More than 50% of time spent counseling and educating patient and her  about epilepsy specific safety issues including AED side effects and interactions, alcohol consumption, sleep deprivation, risks and driving privileges associated with the [place] Guidelines (pt not driving), how treatment may affect contraction and pregnancy, death related to seizures/SUDEP, seizure 1st aid and risks. Patient and  are educated on seizure precautions, including no driving, no operating heavy machinery, no unsupervised swimming or bathing, no unsupervised use of stove, no climbing heights, and no unsupervised use of sharp objects.

## 2021-04-05 NOTE — CONSULT LETTER
[Dear  ___] : Dear  [unfilled], [Sincerely,] : Sincerely, [FreeTextEntry1] : I had the pleasure of seeing your patient, JERRY BUTCHER, in my office today. Please see my note below. \par \par If you have any questions, please do not hesitate to contact me.  [FreeTextEntry3] : Lluvia Betancourt MD\par [facility]\par [facility] [place]\par [address] [place] [address]\par Phone: [phone]; Fax: [phone]\par \par

## 2021-04-05 NOTE — HISTORY OF PRESENT ILLNESS
[FreeTextEntry1] : LAST OFFICE VISIT: 1/5/21\par \par PCP: Dr. Benson\par  nickname: Nora De La Cruz)\par \par INTERIM HISTORY:\par Pt today accompanied by . Did not have any sz on LTG 100mg BID, but very sleepy. Therefore, self-decreased LTG to 100mg daily. Had 1 mild focal aware sz on 3/2, but a bigger focal aware sz on 3/25 with visual aura and severe nausea.\par \par CURRENT AEDs:\par LEV ER 1500mg BID - started 2015; level 51.4 on 8/13/20\par LCM 200mg BID - started 7/2018; level 15.6 on 8/13/20\par LTG 100mg daily – started 10/2020, very sleepy on 100mg BID\par CZP ODT 1mg tabs prn - started 8/2018\par \par \par PRIOR EPILEPSY HISTORY:\par 8/7/18: This is a 38 year-old RH [place] female with no significant PMH who is self referred to establish care for epilepsy. Pt today is accompanied by her . Pt was previously followed by neurologist Dr. Chuck Davis. Pt's  provided most of history since pt speaks limited English.\par \par Onset of sz at age 25, during the 3rd month of pregnancy. Pt was in [facility] at the time. She was not started on AED, nor was she started on AED after her second sz at 9th month of pregnancy. After delivery, pt was started on CBZ. She was sz free on CBZ approximately from [id number]. Doctor in [facility] tapered her off of CBZ. When pt unfortunately sustained another sz, she was put back on CBZ. \par \par [place] immig[place] to the [place] around [place]. At the time, she was planning for second pregnancy, so CBZ was switched to LEV 500mg BID. Next seizure occurred on 8/2016, likely triggered by stress. LEV was increased 1gm BID. When pt was sz free for several years on this dosing, LEV was decreased back to 500mg BID. Unfortunately, with lowered dose of LEV, sz recurred on 7/14/18, while pt was in [place]. She was admitted to [facility]. Head imaging found innumerable lesions throughout R supratentorial hemisphere without focal enhancement, edema, or mass effect. Ddx: cav mal, infectious, less likely Sturge Nicolas or petechial hemorrhage. MRA H/N and cerebral angiogram were unremarkable. Pt and  were not aware of this finding. At discharge, LCM 50mg BID was added to LEV 500mg BID.\par \par === 11/8/18 ===\par Pt today is accompanied by her . Clarified that she is really just only LEV 500mg BID (not 1gm BID) and LCM 50mg BID. She was sz free for several yrs on LEV 1gm BID. Sz recurred after LEV was lowered to 500mg BID. Has not had any further sz since last visit. CURRENT AEDs: LEV 500mg BID - started 2015; LCM 50mg BID - started 7/2018; CLZ ODT 0.5mg prn - started 8/2018.\par \par === 2/19/19 ===\par Pt today is accompanied by her . At the last visit, plan was to cross titrate LEV 500mg BID and LCM 50mg BID to LEV 1gm BID. On 11/13, pt was on LEV 1gm BID and LCM 50mg qHS when she had a typical aura. Pt took CLZ ODT. On 12/24, pt was only on LEV 1gm BID and again had another aura, but this time was very mild. She again took CLZ ODT and called the on-call epilepsy fellow, who instructed pt to go back to LEV 500mg BID and LCM 50mg BID. \par \par Pt also saw Dr. 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 Offered cerebral angiography, but pt said she had it done in [place]. She will be bringing the imaging to Dr. Santo once she obtains a copy. Will f/u Dr. Santo and repeat image in a yr. CURRENT AEDs: LEV 500mg BID - started 2015; LCM 50mg BID - started 7/2018; CLZ ODT 0.5mg prn - started 8/2018.\par \par === 6/3/19 ===\par Pt today is accompanied by her . At the last visit, planned to taper off LCM 50mg BID and increase LEV from 500mg BID to 2000mg BID. Three to four days after coming off of LCM, pt had a breakthrough seizure, needed CZP ODT 1mg x2. Pt now on LCM 50mg BID and LEV 1000mg BID. No further sz on this duo-therapy. CURRENT AEDs: LEV 1000mg BID - started 2015, LCM 50mg BID - started 7/2018, CLZ ODT 1mg tabs prn - started 8/2018.\par \par === 9/11/19 ===\par Since last visit, pt has had 4 seizures that started with buzzing noise, visual aura in L eye, nausea and tachycardia. All of which were aborted with CZP ODT. CURRENT AEDs: LEV 1000mg BID - started 2015\par LCM 50mg BID - started 7/2018; CLZ ODT 1mg tabs prn - started 8/2018.\par \par === 11/6/19 ===\par Pt today is accompanied by her . At the last visit, increased LCM from 50mg BID to 100mg BID, which pt did. She was asked to change LEV IR 1gm BID to ER 1gm BID, which pt did not do. Hasn't had any sz since last visit. Saw Dr. Santo; awaiting MRI brain w/wo. CURRENT AEDs: LEV IR 1000mg BID - started 2015; LCM 100mg BID - started 7/2018; CLZ ODT 1mg tabs prn - started 8/2018.\par \par === 1/8/20 ===\par Pt today is accompanied by her . Since the last visit, has had two visual aura (seeing stars) around menstrual cycle, aborted with CZP. Sz threshold lowered by sleep deprivation. Hasn't gotten blood level of AEDs as instructed at the last visit. Still taking LEV IR and not ER because pt does not want to toss the remaining IR tablets; has a month of supply left. Saw Dr. Santo. MRI 11/27/19 stable compared to 12/8/18. No evidence for edema or hemorrhage. Repeat MRI in a yr. CURRENT AEDs: LEV IR 1000mg BID - started 2015; LCM 100mg BID - started 7/2018; CLZ ODT 1mg tabs prn - started 8/2018.\par \par === 6/2/20 ===\par Had a very small aura early 4/2020, aborted with CLZ x2. LCM 150mg BID increased to 200mg BID by Dr. Navarro on 5/5. No further sz. Pt and  expressed that they are no longer actively trying to conceive. CURRENT AEDs: LEV ER 1000mg BID - started 2015; LCM 200mg BID - started 7/2018; CLZ ODT 1mg tabs prn - started 8/2018.\par \par === 8/13/20 ===\par Increased LEV from 1000mg BID to 1500mg BID at the last visit. Had two focal seizures, aborted with CZP. Most breakthrough seizures occur around menses.\par \par === 9/10/20 ===\par No seizure activity noted since before last visit on 8/13/20. She reports that she had to stop taking the clobazam she was prescribed at her last visit due to the excessive drowsiness it caused. Tired on 5mg qHS; intolerable at 10mg qHS. She reports feeling well today and that her sleep has improved. Pending elective EMU next week. CURRENT AEDs: LEV ER 1500mg BID, LCM 200mg BID, CZP ODT 1mg tabs prn - started 8/2018.\par \par === 10/9/20 ===\par Pt today accompanied by . Did not show up for EMU due to family emergency. Tried CLB at 0.25mg, but remains very sleepy. Had one focal aware sz since last visit. CURRENT AEDs: LEV ER 1500mg BID - started 2015; level 51.4 on 8/13/20; LCM 200mg BID - started 7/2018; level 15.6 on 8/13/20; CZP ODT 1mg tabs prn - started 8/2018.\par \par === 1/5/21 ===\par Pt today accompanied by . At the last visit, started on LTG; tolerating well. Had one aura on 11/26, while on a low dose of LTG at 50mg BID, but no further sz since. Reports isolated right sided tinnitus without any other sx’s (not habitual aura). Cancelled elective EMU due to concern for COVID. Saw Dr. Santo in Nov, who ordered repeat MRI w/wo 12/9, demonstrating stable right panhemispheric cav mal without significant change from prior images. CURRENT AEDs: LEV ER 1500mg BID - started 2015; level 51.4 on 8/13/20; LCM 200mg BID - started 7/2018; level 15.6 on 8/13/20; LTG 100mg BID – started 10/2020; CZP ODT 1mg tabs prn - started 8/2018.\par \par \par SEIZURE DESCRIPTION AND TYPE:\par Type #1\par Severity: focal sz, both aware and impaired (unclear if progresses to true BL tonic clonic sz)\par Onset: 26yo\par Quality & associated signs/symptoms: Prolonged aura of nausea +/- vomiting, geographic shapes (eg. stars, bubbles) in her L visual field -> loss of awareness, head turning/jerking toward the L, tonic-clonic activity in L arm and leg -> postictal confusion, L Miguel Ángel's. Pt developed postictal psychosis (auditory hallucination, paranoia) once after a sz. 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 Patient was a product of normal pregnancy and delivery. 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 Other punctate cavernous malformations in the septum pellucidum, corpus callosum and infratentorial compartment. 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 No change since 12/7/2018. 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 \par \par MRI w/wo 12/8/18 ([facility]): Multiple foci of T2 signal abnormalities associated with serpiginous vessels as described, which may represent cavernomatosis with associated DVA's. Lesions are unusually hemispheric as no lesions are seen within the left cerebral hemisphere. While findings may represent familial versus sporadic cavernomatosis, radiation induced cavernomatosis in the appropriate clinical setting may be considered. Other differential diagnoses of vascular malformations are not excluded. Findings are not consistent with neurosarcoidosis or lymphoproliferative abnormality. \par \par CTH 7/15/18 ([place], [place]): Gyriform calcifications in the R temporooccipital regions. Multiple subcentimeter hyperdense lesions in the R cerebral hemisphere. No significant edema or midline shift. \par \par MRI w/wo 7/14/18 ([place], [place]): Innumerable lesions throughout R supratentorial hemisphere without evidence of edema or mass effect. Diffuse mild enhancement throughout R supratentorial hemisphere. Ddx: cav mal, infectious, less likely Sturge Nicolas or petechial hemorrhage.\par \par MRA H/N w/wo 7/15/18 ([place], [place]): normal\par \par Cerebral angiogram 7/17/18 ([place], [place]): no aneurysm or AVM \par \par NEUROPHYSIOLOGY:\par cvEEG 7/16 - 7/17/18 ([place], [place]): normal awake and asleep, no IED\par \par NEUROPSYCHOLOGY: \par none

## 2021-05-18 ENCOUNTER — APPOINTMENT (OUTPATIENT)
Dept: NEUROLOGY | Facility: CLINIC | Age: 41
End: 2021-05-18
Payer: MEDICAID

## 2021-05-18 VITALS
WEIGHT: 160 LBS | BODY MASS INDEX: 27.31 KG/M2 | SYSTOLIC BLOOD PRESSURE: 109 MMHG | DIASTOLIC BLOOD PRESSURE: 72 MMHG | TEMPERATURE: 98.8 F | HEART RATE: 73 BPM | HEIGHT: 64 IN | OXYGEN SATURATION: 98 %

## 2021-05-18 PROCEDURE — 99214 OFFICE O/P EST MOD 30 MIN: CPT

## 2021-05-18 PROCEDURE — 99072 ADDL SUPL MATRL&STAF TM PHE: CPT

## 2021-05-18 NOTE — CONSULT LETTER
[Dear  ___] : Dear  [unfilled], [Sincerely,] : Sincerely, [FreeTextEntry1] : I had the pleasure of seeing your patient, JERRY BUTCHER, in my office today. Please see my note below. \par \par If you have any questions, please do not hesitate to contact me.  [FreeTextEntry3] : Lluvia Betancourt MD\par [facility]\par [facility] [place]\par [address] [place] [address]\par Phone: [phone]; Fax: [phone]\par \par

## 2021-05-18 NOTE — HISTORY OF PRESENT ILLNESS
[FreeTextEntry1] : LAST OFFICE VISIT: 4/5/21\par \par PCP: Dr. Benson\par  nickname: Nora \par \par INTERIM HISTORY:\par Pt today accompanied by . Did not tolerate LTG ER 150mg QHS due to daytime drowsiness. Self-decreased to IR 100mg QHS. No sz since last visit. Had change of mind about surgery; amenable if needed. Got her 2 doses of COVID-19 vaccine.\par \par CURRENT AEDs:\par LEV ER 1500mg BID - started 2015; level 65.1 on 4/5 \par LCM 200mg BID - started 7/2018; level 13.9 on 4/5\par LTG IR 100mg QHS – started 10/2020, lethargic on 150mg QHS, level 2.7 on 4/5\par CZP ODT 2mg prn - started 8/2018\par \par \par PRIOR EPILEPSY HISTORY:\par 8/7/18: This is a 38 year-old RH [place] female with no significant PMH who is self referred to establish care for epilepsy. Pt today is accompanied by her . Pt was previously followed by neurologist Dr. Chuck Davis. Pt's  provided most of history since pt speaks limited English.\par \par Onset of sz at age 25, during the 3rd month of pregnancy. Pt was in [facility] at the time. She was not started on AED, nor was she started on AED after her second sz at 9th month of pregnancy. After delivery, pt was started on CBZ. She was sz free on CBZ approximately from [id number]. Doctor in [facility] tapered her off of CBZ. When pt unfortunately sustained another sz, she was put back on CBZ. \par \par [place] around [place]. At the time, she was planning for second pregnancy, so CBZ was switched to LEV 500mg BID. Next seizure occurred on 8/2016, likely triggered by stress. LEV was increased 1gm BID. When pt was sz free for several years on this dosing, LEV was decreased back to 500mg BID. Unfortunately, with lowered dose of LEV, sz recurred on 7/14/18, while pt was in [place]. She was admitted to [facility]. Head imaging found innumerable lesions throughout R supratentorial hemisphere without focal enhancement, edema, or mass effect. Ddx: cav mal, infectious, less likely Sturge Nicolas or petechial hemorrhage. MRA H/N and cerebral angiogram were unremarkable. Pt and  were not aware of this finding. At discharge, LCM 50mg BID was added to LEV 500mg BID.\par \par === 11/8/18 ===\par Pt today is accompanied by her . Clarified that she is really just only LEV 500mg BID (not 1gm BID) and LCM 50mg BID. She was sz free for several yrs on LEV 1gm BID. Sz recurred after LEV was lowered to 500mg BID. Has not had any further sz since last visit. CURRENT AEDs: LEV 500mg BID - started 2015; LCM 50mg BID - started 7/2018; CLZ ODT 0.5mg prn - started 8/2018.\par \par === 2/19/19 ===\par Pt today is accompanied by her . At the last visit, plan was to cross titrate LEV 500mg BID and LCM 50mg BID to LEV 1gm BID. On 11/13, pt was on LEV 1gm BID and LCM 50mg qHS when she had a typical aura. Pt took CLZ ODT. On 12/24, pt was only on LEV 1gm BID and again had another aura, but this time was very mild. She again took CLZ ODT and called the on-call epilepsy fellow, who instructed pt to go back to LEV 500mg BID and LCM 50mg BID. \par \par Pt also saw Dr. 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 Offered cerebral angiography, but pt said she had it done in [place]. She will be bringing the imaging to Dr. Santo once she obtains a copy. Will f/u Dr. Santo and repeat image in a yr. CURRENT AEDs: LEV 500mg BID - started 2015; LCM 50mg BID - started 7/2018; CLZ ODT 0.5mg prn - started 8/2018.\par \par === 6/3/19 ===\par Pt today is accompanied by her . At the last visit, planned to taper off LCM 50mg BID and increase LEV from 500mg BID to 2000mg BID. Three to four days after coming off of LCM, pt had a breakthrough seizure, needed CZP ODT 1mg x2. Pt now on LCM 50mg BID and LEV 1000mg BID. No further sz on this duo-therapy. CURRENT AEDs: LEV 1000mg BID - started 2015, LCM 50mg BID - started 7/2018, CLZ ODT 1mg tabs prn - started 8/2018.\par \par === 9/11/19 ===\par Since last visit, pt has had 4 seizures that started with buzzing noise, visual aura in L eye, nausea and tachycardia. All of which were aborted with CZP ODT. CURRENT AEDs: LEV 1000mg BID - started 2015\par LCM 50mg BID - started 7/2018; CLZ ODT 1mg tabs prn - started 8/2018.\par \par === 11/6/19 ===\par Pt today is accompanied by her . At the last visit, increased LCM from 50mg BID to 100mg BID, which pt did. She was asked to change LEV IR 1gm BID to ER 1gm BID, which pt did not do. Hasn't had any sz since last visit. Saw Dr. Santo; awaiting MRI brain w/wo. CURRENT AEDs: LEV IR 1000mg BID - started 2015; LCM 100mg BID - started 7/2018; CLZ ODT 1mg tabs prn - started 8/2018.\par \par === 1/8/20 ===\par Pt today is accompanied by her . Since the last visit, has had two visual aura (seeing stars) around menstrual cycle, aborted with CZP. Sz threshold lowered by sleep deprivation. Hasn't gotten blood level of AEDs as instructed at the last visit. Still taking LEV IR and not ER because pt does not want to toss the remaining IR tablets; has a month of supply left. Saw Dr. Santo. MRI 11/27/19 stable compared to 12/8/18. No evidence for edema or hemorrhage. Repeat MRI in a yr. CURRENT AEDs: LEV IR 1000mg BID - started 2015; LCM 100mg BID - started 7/2018; CLZ ODT 1mg tabs prn - started 8/2018.\par \par === 6/2/20 ===\par Had a very small aura early 4/2020, aborted with CLZ x2. LCM 150mg BID increased to 200mg BID by Dr. Navarro on 5/5. No further sz. Pt and  expressed that they are no longer actively trying to conceive. CURRENT AEDs: LEV ER 1000mg BID - started 2015; LCM 200mg BID - started 7/2018; CLZ ODT 1mg tabs prn - started 8/2018.\par \par === 8/13/20 ===\par Increased LEV from 1000mg BID to 1500mg BID at the last visit. Had two focal seizures, aborted with CZP. Most breakthrough seizures occur around menses.\par \par === 9/10/20 ===\par No seizure activity noted since before last visit on 8/13/20. She reports that she had to stop taking the clobazam she was prescribed at her last visit due to the excessive drowsiness it caused. Tired on 5mg qHS; intolerable at 10mg qHS. She reports feeling well today and that her sleep has improved. Pending elective EMU next week. CURRENT AEDs: LEV ER 1500mg BID, LCM 200mg BID, CZP ODT 1mg tabs prn - started 8/2018.\par \par === 10/9/20 ===\par Pt today accompanied by . Did not show up for EMU due to family emergency. Tried CLB at 0.25mg, but remains very sleepy. Had one focal aware sz since last visit. CURRENT AEDs: LEV ER 1500mg BID - started 2015; level 51.4 on 8/13/20; LCM 200mg BID - started 7/2018; level 15.6 on 8/13/20; CZP ODT 1mg tabs prn - started 8/2018.\par \par === 1/5/21 ===\par Pt today accompanied by . At the last visit, started on LTG; tolerating well. Had one aura on 11/26, while on a low dose of LTG at 50mg BID, but no further sz since. Reports isolated right sided tinnitus without any other sx’s (not habitual aura). Cancelled elective EMU due to concern for COVID. Saw Dr. Santo in Nov, who ordered repeat MRI w/wo 12/9, demonstrating stable right panhemispheric cav mal without significant change from prior images. CURRENT AEDs: LEV ER 1500mg BID - started 2015; level 51.4 on 8/13/20; LCM 200mg BID - started 7/2018; level 15.6 on 8/13/20; LTG 100mg BID – started 10/2020; CZP ODT 1mg tabs prn - started 8/2018.\par \par === 4/5/21 ===\par Pt today accompanied by . Did not have any sz on LTG 100mg BID, but very sleepy. Therefore, self-decreased LTG to 100mg daily. Had 1 mild focal aware sz on 3/2, but a bigger focal aware sz on 3/25 with visual aura and severe nausea. CURRENT AEDs: LEV ER 1500mg BID - started 2015; level 51.4 on 8/13/20; LCM 200mg BID - started 7/2018; level 15.6 on 8/13/20; LTG 100mg daily – started 10/2020, very sleepy on 100mg BID; CZP ODT 1mg tabs prn - started 8/2018.\par \par \par SEIZURE DESCRIPTION AND TYPE:\par Type #1\par Severity: focal sz, both aware and impaired (unclear if progresses to true BL tonic clonic sz)\par Onset: 24yo\par Quality & associated signs/symptoms: Prolonged aura of nausea +/- vomiting, geographic shapes (eg. stars, bubbles) in her L visual field -> loss of awareness, head turning/jerking toward the L, tonic-clonic activity in L arm and leg -> postictal confusion, L Miguel Ángel's. Pt developed postictal psychosis (auditory hallucination, paranoia) once after a sz. 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 Patient was a product of normal pregnancy and delivery. 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 Other punctate cavernous malformations in the septum pellucidum, corpus callosum and infratentorial compartment. 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 No change since 12/7/2018. 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 \par \par MRI w/wo 12/8/18 ([facility]): Multiple foci of T2 signal abnormalities associated with serpiginous vessels as described, which may represent cavernomatosis with associated DVA's. Lesions are unusually hemispheric as no lesions are seen within the left cerebral hemisphere. While findings may represent familial versus sporadic cavernomatosis, radiation induced cavernomatosis in the appropriate clinical setting may be considered. Other differential diagnoses of vascular malformations are not excluded. Findings are not consistent with neurosarcoidosis or lymphoproliferative abnormality. \par \par CTH 7/15/18 ([place], [place]): Gyriform calcifications in the R temporooccipital regions. Multiple subcentimeter hyperdense lesions in the R cerebral hemisphere. No significant edema or midline shift. \par \par MRI w/wo 7/14/18 ([place], [place]): Innumerable lesions throughout R supratentorial hemisphere without evidence of edema or mass effect. Diffuse mild enhancement throughout R supratentorial hemisphere. Ddx: cav mal, infectious, less likely Sturge Nicolas or petechial hemorrhage.\par \par MRA H/N w/wo 7/15/18 ([place], [place]): normal\par \par Cerebral angiogram 7/17/18 ([place], [place]): no aneurysm or AVM \par \par NEUROPHYSIOLOGY:\par cvEEG 7/16 - 7/17/18 ([place], [place]): normal awake and asleep, no IED\par \par NEUROPSYCHOLOGY: \par none

## 2021-05-18 NOTE — ASSESSMENT
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 Santo) and DVAs who presented for followup for focal epilepsy characterized by focal aware and impaired awareness sz's (unclear if progresses to true BL tonic clonic).\par \par Pt now amenable to epilepsy surgery. If pt were to have another sz, start CNB and admit to EMU for pre-surg evaluation. All questions and concerns answered and addressed in detail to pt and 's complete satisfaction. Patient and her  verbalized understanding and agreed to plan.\par \par \par - change LTG IR 100mg daily to ER 100mg daily\par - continue LEV ER 1500mg BID\par - continue LCM 200mg BID \par - cont CLZ ODT 1mg prn aura\par - cont folic acid 4mg daily (prefers to stay on 4mg even though no plan to conceive)\par - pt not driving\par - f/u in 3 months\par \par \par All relevant epilepsy AAN quality care measures were addressed and discussed with the patient and her .\par \par More than 50% of time spent counseling and educating patient and her  about epilepsy specific safety issues including AED side effects and interactions, alcohol consumption, sleep deprivation, risks and driving privileges associated with the [place] Guidelines (pt not driving), how treatment may affect contraction and pregnancy, death related to seizures/SUDEP, seizure 1st aid and risks. Patient and  are educated on seizure precautions, including no driving, no operating heavy machinery, no unsupervised swimming or bathing, no unsupervised use of stove, no climbing heights, and no unsupervised use of sharp objects.

## 2021-06-22 ENCOUNTER — APPOINTMENT (OUTPATIENT)
Dept: OPHTHALMOLOGY | Facility: CLINIC | Age: 41
End: 2021-06-22

## 2021-08-17 ENCOUNTER — APPOINTMENT (OUTPATIENT)
Dept: NEUROLOGY | Facility: CLINIC | Age: 41
End: 2021-08-17
Payer: MEDICAID

## 2021-08-17 VITALS
HEIGHT: 64 IN | BODY MASS INDEX: 27.31 KG/M2 | WEIGHT: 160 LBS | TEMPERATURE: 97.5 F | DIASTOLIC BLOOD PRESSURE: 81 MMHG | SYSTOLIC BLOOD PRESSURE: 121 MMHG | OXYGEN SATURATION: 99 % | HEART RATE: 62 BPM

## 2021-08-17 PROCEDURE — 99214 OFFICE O/P EST MOD 30 MIN: CPT

## 2021-08-17 RX ORDER — LAMOTRIGINE 100 MG/1
100 TABLET, EXTENDED RELEASE ORAL
Qty: 30 | Refills: 3 | Status: COMPLETED | COMMUNITY
Start: 2021-04-05 | End: 2021-08-17

## 2021-08-23 NOTE — ASSESSMENT
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 Santo) and DVAs who presented for followup for focal epilepsy characterized by focal aware and impaired awareness sz's (unclear if progresses to true BL tonic clonic).\par \par Pt not interested in surgical options unless it can guarantee sz freedom. Will taper off LTG and start CNB. All questions and concerns answered and addressed in detail to pt and 's complete satisfaction. Patient and her  verbalized understanding and agreed to plan.\par \par \par - Start CNB. Following titration schedule printed and provided to pt. Educated pt on side effects, including monitoring for rash daily. Stop taking CNB and call office immediately if rash appears. \par wk 1-2: 12.5mg daily  \par wk 3-4: 25mg daily \par wk 5-6: 50mg daily \par wk 7-8: 100mg daily \par wk 9-10: 150mg daily\par wk 11 and onward: 200mg daily\par \par - taper off LTG 100mg daily: 50mg QHS x1wk -> stop\par - continue LEV ER 1500mg BID; decrease dose if report of drowsiness\par - continue LCM 200mg BID; decrease dose if report of drowsiness\par - cont CLZ ODT 1mg prn aura\par - cont folic acid 4mg daily (prefers to stay on 4mg even though no plan to conceive)\par - pt not driving\par - f/u in 2 months\par \par \par All relevant epilepsy AAN quality care measures were addressed and discussed with the patient and her .\par \par More than 50% of time spent counseling and educating patient and her  about epilepsy specific safety issues including AED side effects and interactions, alcohol consumption, sleep deprivation, risks and driving privileges associated with the [place] Guidelines (pt not driving), how treatment may affect contraction and pregnancy, death related to seizures/SUDEP, seizure 1st aid and risks. Patient and  are educated on seizure precautions, including no driving, no operating heavy machinery, no unsupervised swimming or bathing, no unsupervised use of stove, no climbing heights, and no unsupervised use of sharp objects.

## 2021-08-23 NOTE — CONSULT LETTER
[Dear  ___] : Dear  [unfilled], [Sincerely,] : Sincerely, [FreeTextEntry1] : I had the pleasure of seeing your patient, JERRY BUTCHER, in my office today. Please see my note below. \par \par If you have any questions, please do not hesitate to contact me.  [FreeTextEntry3] : Lluvia Betancourt MD\par Director, Epilepsy/EMU\par [facility] \par [facility] [place]\par [address] [place] [address] \par Tel: [phone]; Fax: [phone]

## 2021-08-23 NOTE — HISTORY OF PRESENT ILLNESS
[FreeTextEntry1] : LAST OFFICE VISIT: 5/18/21\par \par PCP: Dr. Benson\par  nickname: Nora \par \par INTERIM HISTORY:\par Pt today accompanied by . One focal sz since last visit. Reports hearing voices or feels like someone is in the room after the last sz. Pt has had this many years ago after a cluster of seizures. Symptoms self-resolved after a few months. Pt was amenable to surgery at the last visit, but after explaining to the pt that surgery is unable to guarantee sz freedom, pt no longer interested in surgical options. \par \par CURRENT AEDs:\par LEV ER 1500mg BID - started 2015; level 65.1 on 4/5 \par LCM 200mg BID - started 7/2018; level 13.9 on 4/5\par LTG ER 100mg QHS – started 10/2020, lethargic on 150mg QHS, level 2.7 on 4/5\par CZP ODT 2mg prn - started 8/2018\par \par \par PRIOR EPILEPSY HISTORY:\par 8/7/18: This is a 38 year-old RH Fijian female with no significant PMH who is self referred to establish care for epilepsy. Pt today is accompanied by her . Pt was previously followed by neurologist Dr. Chuck Davis. Pt's  provided most of history since pt speaks limited English.\par \par Onset of sz at age 25, during the 3rd month of pregnancy. Pt was in [facility] at the time. She was not started on AED, nor was she started on AED after her second sz at 9th month of pregnancy. After delivery, pt was started on CBZ. She was sz free on CBZ approximately from [id number]. Doctor in [facility] tapered her off of CBZ. When pt unfortunately sustained another sz, she was put back on CBZ. \par \par Pt immig[place] to the [place] around [place]. At the time, she was planning for second pregnancy, so CBZ was switched to LEV 500mg BID. Next seizure occurred on 8/2016, likely triggered by stress. LEV was increased 1gm BID. When pt was sz free for several years on this dosing, LEV was decreased back to 500mg BID. Unfortunately, with lowered dose of LEV, sz recurred on 7/14/18, while pt was in [place]. She was admitted to [facility]. Head imaging found innumerable lesions throughout R supratentorial hemisphere without focal enhancement, edema, or mass effect. Ddx: cav mal, infectious, less likely Sturge Nicolas or petechial hemorrhage. MRA H/N and cerebral angiogram were unremarkable. Pt and  were not aware of this finding. At discharge, LCM 50mg BID was added to LEV 500mg BID.\par \par === 11/8/18 ===\par Pt today is accompanied by her . Clarified that she is really just only LEV 500mg BID (not 1gm BID) and LCM 50mg BID. She was sz free for several yrs on LEV 1gm BID. Sz recurred after LEV was lowered to 500mg BID. Has not had any further sz since last visit. CURRENT AEDs: LEV 500mg BID - started 2015; LCM 50mg BID - started 7/2018; CLZ ODT 0.5mg prn - started 8/2018.\par \par === 2/19/19 ===\par Pt today is accompanied by her . At the last visit, plan was to cross titrate LEV 500mg BID and LCM 50mg BID to LEV 1gm BID. On 11/13, pt was on LEV 1gm BID and LCM 50mg qHS when she had a typical aura. Pt took CLZ ODT. On 12/24, pt was only on LEV 1gm BID and again had another aura, but this time was very mild. She again took CLZ ODT and called the on-call epilepsy fellow, who instructed pt to go back to LEV 500mg BID and LCM 50mg BID. \par \par Pt also saw Dr. 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 Offered cerebral angiography, but pt said she had it done in [place]. She will be bringing the imaging to Dr. Santo once she obtains a copy. Will f/u Dr. Santo and repeat image in a yr. CURRENT AEDs: LEV 500mg BID - started 2015; LCM 50mg BID - started 7/2018; CLZ ODT 0.5mg prn - started 8/2018.\par \par === 6/3/19 ===\par Pt today is accompanied by her . At the last visit, planned to taper off LCM 50mg BID and increase LEV from 500mg BID to 2000mg BID. Three to four days after coming off of LCM, pt had a breakthrough seizure, needed CZP ODT 1mg x2. Pt now on LCM 50mg BID and LEV 1000mg BID. No further sz on this duo-therapy. CURRENT AEDs: LEV 1000mg BID - started 2015, LCM 50mg BID - started 7/2018, CLZ ODT 1mg tabs prn - started 8/2018.\par \par === 9/11/19 ===\par Since last visit, pt has had 4 seizures that started with buzzing noise, visual aura in L eye, nausea and tachycardia. All of which were aborted with CZP ODT. CURRENT AEDs: LEV 1000mg BID - started 2015\par LCM 50mg BID - started 7/2018; CLZ ODT 1mg tabs prn - started 8/2018.\par \par === 11/6/19 ===\par Pt today is accompanied by her . At the last visit, increased LCM from 50mg BID to 100mg BID, which pt did. She was asked to change LEV IR 1gm BID to ER 1gm BID, which pt did not do. Hasn't had any sz since last visit. Saw Dr. Santo; awaiting MRI brain w/wo. CURRENT AEDs: LEV IR 1000mg BID - started 2015; LCM 100mg BID - started 7/2018; CLZ ODT 1mg tabs prn - started 8/2018.\par \par === 1/8/20 ===\par Pt today is accompanied by her . Since the last visit, has had two visual aura (seeing stars) around menstrual cycle, aborted with CZP. Sz threshold lowered by sleep deprivation. Hasn't gotten blood level of AEDs as instructed at the last visit. Still taking LEV IR and not ER because pt does not want to toss the remaining IR tablets; has a month of supply left. Saw Dr. Santo. MRI 11/27/19 stable compared to 12/8/18. No evidence for edema or hemorrhage. Repeat MRI in a yr. CURRENT AEDs: LEV IR 1000mg BID - started 2015; LCM 100mg BID - started 7/2018; CLZ ODT 1mg tabs prn - started 8/2018.\par \par === 6/2/20 ===\par Had a very small aura early 4/2020, aborted with CLZ x2. LCM 150mg BID increased to 200mg BID by Dr. Navarro on 5/5. No further sz. Pt and  expressed that they are no longer actively trying to conceive. CURRENT AEDs: LEV ER 1000mg BID - started 2015; LCM 200mg BID - started 7/2018; CLZ ODT 1mg tabs prn - started 8/2018.\par \par === 8/13/20 ===\par Increased LEV from 1000mg BID to 1500mg BID at the last visit. Had two focal seizures, aborted with CZP. Most breakthrough seizures occur around menses.\par \par === 9/10/20 ===\par No seizure activity noted since before last visit on 8/13/20. She reports that she had to stop taking the clobazam she was prescribed at her last visit due to the excessive drowsiness it caused. Tired on 5mg qHS; intolerable at 10mg qHS. She reports feeling well today and that her sleep has improved. Pending elective EMU next week. CURRENT AEDs: LEV ER 1500mg BID, LCM 200mg BID, CZP ODT 1mg tabs prn - started 8/2018.\par \par === 10/9/20 ===\par Pt today accompanied by . Did not show up for EMU due to family emergency. Tried CLB at 0.25mg, but remains very sleepy. Had one focal aware sz since last visit. CURRENT AEDs: LEV ER 1500mg BID - started 2015; level 51.4 on 8/13/20; LCM 200mg BID - started 7/2018; level 15.6 on 8/13/20; CZP ODT 1mg tabs prn - started 8/2018.\par \par === 1/5/21 ===\par Pt today accompanied by . At the last visit, started on LTG; tolerating well. Had one aura on 11/26, while on a low dose of LTG at 50mg BID, but no further sz since. Reports isolated right sided tinnitus without any other sx’s (not habitual aura). Cancelled elective EMU due to concern for COVID. Saw Dr. Santo in Nov, who ordered repeat MRI w/wo 12/9, demonstrating stable right panhemispheric cav mal without significant change from prior images. CURRENT AEDs: LEV ER 1500mg BID - started 2015; level 51.4 on 8/13/20; LCM 200mg BID - started 7/2018; level 15.6 on 8/13/20; LTG 100mg BID – started 10/2020; CZP ODT 1mg tabs prn - started 8/2018.\par \par === 4/5/21 ===\par Pt today accompanied by . Did not have any sz on LTG 100mg BID, but very sleepy. Therefore, self-decreased LTG to 100mg daily. Had 1 mild focal aware sz on 3/2, but a bigger focal aware sz on 3/25 with visual aura and severe nausea. CURRENT AEDs: LEV ER 1500mg BID - started 2015; level 51.4 on 8/13/20; LCM 200mg BID - started 7/2018; level 15.6 on 8/13/20; LTG 100mg daily – started 10/2020, very sleepy on 100mg BID; CZP ODT 1mg tabs prn - started 8/2018.\par \par === 5/18/21 ===\par Pt today accompanied by . Did not tolerate LTG ER 150mg QHS due to daytime drowsiness. Self-decreased to IR 100mg QHS. No sz since last visit. Had change of mind about surgery; amenable if needed. Got her 2 doses of COVID-19 vaccine. CURRENT AEDs: LEV ER 1500mg BID - started 2015; level 65.1 on 4/5; LCM 200mg BID - started 7/2018; level 13.9 on 4/5; LTG IR 100mg QHS – started 10/2020, lethargic on 150mg QHS, level 2.7 on 4/5; CZP ODT 2mg prn - started 8/2018.\par \par \par SEIZURE DESCRIPTION AND TYPE:\par Type #1\par Severity: focal sz, both aware and impaired (unclear if progresses to true BL tonic clonic sz)\par Onset: 26yo\par Quality & associated signs/symptoms: Prolonged aura of nausea +/- vomiting, geographic shapes (eg. stars, bubbles) in her L visual field -> loss of awareness, head turning/jerking toward the L, tonic-clonic activity in L arm and leg -> postictal confusion, L Miguel Ángel's. Pt developed postictal psychosis (auditory hallucination, paranoia) once after a sz. 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 Patient was a product of normal pregnancy and delivery. 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 Other punctate cavernous malformations in the septum pellucidum, corpus callosum and infratentorial compartment. 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 No change since 12/7/2018. 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 \par \par MRI w/wo 12/8/18 ([facility]): Multiple foci of T2 signal abnormalities associated with serpiginous vessels as described, which may represent cavernomatosis with associated DVA's. Lesions are unusually hemispheric as no lesions are seen within the left cerebral hemisphere. While findings may represent familial versus sporadic cavernomatosis, radiation induced cavernomatosis in the appropriate clinical setting may be considered. Other differential diagnoses of vascular malformations are not excluded. Findings are not consistent with neurosarcoidosis or lymphoproliferative abnormality. \par \par CTH 7/15/18 ([place], [place]): Gyriform calcifications in the R temporooccipital regions. Multiple subcentimeter hyperdense lesions in the R cerebral hemisphere. No significant edema or midline shift. \par \par MRI w/wo 7/14/18 ([place], [place]): Innumerable lesions throughout R supratentorial hemisphere without evidence of edema or mass effect. Diffuse mild enhancement throughout R supratentorial hemisphere. Ddx: cav mal, infectious, less likely Sturge Nicolas or petechial hemorrhage.\par \par MRA H/N w/wo 7/15/18 ([place], [place]): normal\par \par Cerebral angiogram 7/17/18 ([place], [place]): no aneurysm or AVM \par \par NEUROPHYSIOLOGY:\par cvEEG 7/16 - 7/17/18 ([place], [place]): normal awake and asleep, no IED\par \par NEUROPSYCHOLOGY: \par none\par

## 2021-10-18 ENCOUNTER — APPOINTMENT (OUTPATIENT)
Dept: NEUROLOGY | Facility: CLINIC | Age: 41
End: 2021-10-18
Payer: MEDICAID

## 2021-10-18 VITALS
WEIGHT: 160 LBS | HEIGHT: 64 IN | TEMPERATURE: 97.8 F | OXYGEN SATURATION: 98 % | SYSTOLIC BLOOD PRESSURE: 116 MMHG | DIASTOLIC BLOOD PRESSURE: 79 MMHG | BODY MASS INDEX: 27.31 KG/M2 | HEART RATE: 78 BPM

## 2021-10-18 PROCEDURE — 99214 OFFICE O/P EST MOD 30 MIN: CPT

## 2021-10-18 RX ORDER — LAMOTRIGINE 25 MG/1
25 TABLET ORAL AT BEDTIME
Qty: 14 | Refills: 0 | Status: COMPLETED | COMMUNITY
Start: 2021-08-17 | End: 2021-10-18

## 2021-10-18 RX ORDER — CENOBAMATE 50MG-100MG
14 X 50 MG & KIT ORAL
Qty: 28 | Refills: 0 | Status: COMPLETED | COMMUNITY
Start: 2021-08-17 | End: 2021-10-18

## 2021-10-18 RX ORDER — CENOBAMATE 12.5-25MG
14 X 12.5 MG & KIT ORAL
Qty: 28 | Refills: 0 | Status: COMPLETED | COMMUNITY
Start: 2021-08-17 | End: 2021-10-18

## 2021-10-19 NOTE — CONSULT LETTER
[Dear  ___] : Dear  [unfilled], [Sincerely,] : Sincerely, [FreeTextEntry1] : I had the pleasure of seeing your patient, JERRY BUTCHER, in my office today. Please see my note below. \par \par If you have any questions, please do not hesitate to contact me.  [FreeTextEntry3] : Lluvia Betancourt MD\par Director, Epilepsy/EMU\par [facility] \par [facility] [place]\par [address] [place] [address] \par Tel: [phone]; Fax: [phone]

## 2021-10-19 NOTE — HISTORY OF PRESENT ILLNESS
[FreeTextEntry1] : LAST OFFICE VISIT: 8/17/21\par \par PCP: Dr. Benson\par  nickname: Nora \par \par INTERIM HISTORY:\par Pt today accompanied by . Tapered off of LTG. Seizures responding very well to CNB. Called 9/28/21 and reported fatigue on multiple AEDs. Instruction given to slowly lower LEV ER 1500mg BID to 100mg BID and LCM 200mg BID to 150mg BID. One mild focal aware sz as pt decreased the 2 AEDs, but no further sz since. \par \par CURRENT AEDs:\par CNB 100mg QHS – started 8/2021\par LEV ER 1000mg BID – started 2015; level 65.1 on 4/5 on 1500mg BID\par LCM 150mg BID – started 7/2018; level 13.9 on 4/5 on 200mg BID\par CZP ODT 2mg prn – started 8/2018\par \par \par PRIOR EPILEPSY HISTORY:\par 8/7/18: This is a 38 year-old RH [place] female with no significant PMH who is self referred to establish care for epilepsy. Pt today is accompanied by her . Pt was previously followed by neurologist Dr. Chuck Davis. Pt's  provided most of history since pt speaks limited English.\par \par Onset of sz at age 25, during the 3rd month of pregnancy. Pt was in [facility] at the time. She was not started on AED, nor was she started on AED after her second sz at 9th month of pregnancy. After delivery, pt was started on CBZ. She was sz free on CBZ approximately from [id number]. Doctor in [facility] tapered her off of CBZ. When pt unfortunately sustained another sz, she was put back on CBZ. \par \par Pt immig[place] to the [place] around [place]. At the time, she was planning for second pregnancy, so CBZ was switched to LEV 500mg BID. Next seizure occurred on 8/2016, likely triggered by stress. LEV was increased 1gm BID. When pt was sz free for several years on this dosing, LEV was decreased back to 500mg BID. Unfortunately, with lowered dose of LEV, sz recurred on 7/14/18, while pt was in [place]. She was admitted to [facility]. Head imaging found innumerable lesions throughout R supratentorial hemisphere without focal enhancement, edema, or mass effect. Ddx: cav mal, infectious, less likely Sturge Nicolas or petechial hemorrhage. MRA H/N and cerebral angiogram were unremarkable. Pt and  were not aware of this finding. At discharge, LCM 50mg BID was added to LEV 500mg BID.\par \par === 11/8/18 ===\par Pt today is accompanied by her . Clarified that she is really just only LEV 500mg BID (not 1gm BID) and LCM 50mg BID. She was sz free for several yrs on LEV 1gm BID. Sz recurred after LEV was lowered to 500mg BID. Has not had any further sz since last visit. CURRENT AEDs: LEV 500mg BID - started 2015; LCM 50mg BID - started 7/2018; CLZ ODT 0.5mg prn - started 8/2018.\par \par === 2/19/19 ===\par Pt today is accompanied by her . At the last visit, plan was to cross titrate LEV 500mg BID and LCM 50mg BID to LEV 1gm BID. On 11/13, pt was on LEV 1gm BID and LCM 50mg qHS when she had a typical aura. Pt took CLZ ODT. On 12/24, pt was only on LEV 1gm BID and again had another aura, but this time was very mild. She again took CLZ ODT and called the on-call epilepsy fellow, who instructed pt to go back to LEV 500mg BID and LCM 50mg BID. \par \par Pt also saw Dr. 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 Offered cerebral angiography, but pt said she had it done in [place]. She will be bringing the imaging to Dr. Santo once she obtains a copy. Will f/u Dr. Santo and repeat image in a yr. CURRENT AEDs: LEV 500mg BID - started 2015; LCM 50mg BID - started 7/2018; CLZ ODT 0.5mg prn - started 8/2018.\par \par === 6/3/19 ===\par Pt today is accompanied by her . At the last visit, planned to taper off LCM 50mg BID and increase LEV from 500mg BID to 2000mg BID. Three to four days after coming off of LCM, pt had a breakthrough seizure, needed CZP ODT 1mg x2. Pt now on LCM 50mg BID and LEV 1000mg BID. No further sz on this duo-therapy. CURRENT AEDs: LEV 1000mg BID - started 2015, LCM 50mg BID - started 7/2018, CLZ ODT 1mg tabs prn - started 8/2018.\par \par === 9/11/19 ===\par Since last visit, pt has had 4 seizures that started with buzzing noise, visual aura in L eye, nausea and tachycardia. All of which were aborted with CZP ODT. CURRENT AEDs: LEV 1000mg BID - started 2015\par LCM 50mg BID - started 7/2018; CLZ ODT 1mg tabs prn - started 8/2018.\par \par === 11/6/19 ===\par Pt today is accompanied by her . At the last visit, increased LCM from 50mg BID to 100mg BID, which pt did. She was asked to change LEV IR 1gm BID to ER 1gm BID, which pt did not do. Hasn't had any sz since last visit. Saw Dr. Santo; awaiting MRI brain w/wo. CURRENT AEDs: LEV IR 1000mg BID - started 2015; LCM 100mg BID - started 7/2018; CLZ ODT 1mg tabs prn - started 8/2018.\par \par === 1/8/20 ===\par Pt today is accompanied by her . Since the last visit, has had two visual aura (seeing stars) around menstrual cycle, aborted with CZP. Sz threshold lowered by sleep deprivation. Hasn't gotten blood level of AEDs as instructed at the last visit. Still taking LEV IR and not ER because pt does not want to toss the remaining IR tablets; has a month of supply left. Saw Dr. Santo. MRI 11/27/19 stable compared to 12/8/18. No evidence for edema or hemorrhage. Repeat MRI in a yr. CURRENT AEDs: LEV IR 1000mg BID - started 2015; LCM 100mg BID - started 7/2018; CLZ ODT 1mg tabs prn - started 8/2018.\par \par === 6/2/20 ===\par Had a very small aura early 4/2020, aborted with CLZ x2. LCM 150mg BID increased to 200mg BID by Dr. Navarro on 5/5. No further sz. Pt and  expressed that they are no longer actively trying to conceive. CURRENT AEDs: LEV ER 1000mg BID - started 2015; LCM 200mg BID - started 7/2018; CLZ ODT 1mg tabs prn - started 8/2018.\par \par === 8/13/20 ===\par Increased LEV from 1000mg BID to 1500mg BID at the last visit. Had two focal seizures, aborted with CZP. Most breakthrough seizures occur around menses.\par \par === 9/10/20 ===\par No seizure activity noted since before last visit on 8/13/20. She reports that she had to stop taking the clobazam she was prescribed at her last visit due to the excessive drowsiness it caused. Tired on 5mg qHS; intolerable at 10mg qHS. She reports feeling well today and that her sleep has improved. Pending elective EMU next week. CURRENT AEDs: LEV ER 1500mg BID, LCM 200mg BID, CZP ODT 1mg tabs prn - started 8/2018.\par \par === 10/9/20 ===\par Pt today accompanied by . Did not show up for EMU due to family emergency. Tried CLB at 0.25mg, but remains very sleepy. Had one focal aware sz since last visit. CURRENT AEDs: LEV ER 1500mg BID - started 2015; level 51.4 on 8/13/20; LCM 200mg BID - started 7/2018; level 15.6 on 8/13/20; CZP ODT 1mg tabs prn - started 8/2018.\par \par === 1/5/21 ===\par Pt today accompanied by . At the last visit, started on LTG; tolerating well. Had one aura on 11/26, while on a low dose of LTG at 50mg BID, but no further sz since. Reports isolated right sided tinnitus without any other sx’s (not habitual aura). Cancelled elective EMU due to concern for COVID. Saw Dr. Santo in Nov, who ordered repeat MRI w/wo 12/9, demonstrating stable right panhemispheric cav mal without significant change from prior images. CURRENT AEDs: LEV ER 1500mg BID - started 2015; level 51.4 on 8/13/20; LCM 200mg BID - started 7/2018; level 15.6 on 8/13/20; LTG 100mg BID – started 10/2020; CZP ODT 1mg tabs prn - started 8/2018.\par \par === 4/5/21 ===\par Pt today accompanied by . Did not have any sz on LTG 100mg BID, but very sleepy. Therefore, self-decreased LTG to 100mg daily. Had 1 mild focal aware sz on 3/2, but a bigger focal aware sz on 3/25 with visual aura and severe nausea. CURRENT AEDs: LEV ER 1500mg BID - started 2015; level 51.4 on 8/13/20; LCM 200mg BID - started 7/2018; level 15.6 on 8/13/20; LTG 100mg daily – started 10/2020, very sleepy on 100mg BID; CZP ODT 1mg tabs prn - started 8/2018.\par \par === 5/18/21 ===\par Pt today accompanied by . Did not tolerate LTG ER 150mg QHS due to daytime drowsiness. Self-decreased to IR 100mg QHS. No sz since last visit. Had change of mind about surgery; amenable if needed. Got her 2 doses of COVID-19 vaccine. CURRENT AEDs: LEV ER 1500mg BID - started 2015; level 65.1 on 4/5; LCM 200mg BID - started 7/2018; level 13.9 on 4/5; LTG IR 100mg QHS – started 10/2020, lethargic on 150mg QHS, level 2.7 on 4/5; CZP ODT 2mg prn - started 8/2018.\par \par === 8/17/21 ===\par Pt today accompanied by . One focal sz since last visit. Reports hearing voices or feels like someone is in the room after the last sz. Pt has had this many years ago after a cluster of seizures. Symptoms self-resolved after a few months. Pt was amenable to surgery at the last visit, but after explaining to the pt that surgery is unable to guarantee sz freedom, pt no longer interested in surgical options. CURRENT AEDs: LEV ER 1500mg BID - started 2015; level 65.1 on 4/5; LCM 200mg BID - started 7/2018; level 13.9 on 4/5; LTG ER 100mg QHS – started 10/2020, lethargic on 150mg QHS, level 2.7 on 4/5; CZP ODT 2mg prn - started 8/2018.\par \par \par SEIZURE DESCRIPTION AND TYPE:\par Type #1\par Severity: focal sz, both aware and impaired (unclear if progresses to true BL tonic clonic sz)\par Onset: 26yo\par Quality & associated signs/symptoms: Prolonged aura of nausea +/- vomiting, geographic shapes (eg. stars, bubbles) in her L visual field -> loss of awareness, head turning/jerking toward the L, tonic-clonic activity in L arm and leg -> postictal confusion, L Miguel Ángel's. Pt developed postictal psychosis (auditory hallucination, paranoia) once after a sz. 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 Patient was a product of normal pregnancy and delivery. 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 Other punctate cavernous malformations in the septum pellucidum, corpus callosum and infratentorial compartment. 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 No change since 12/7/2018. 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 \par \par MRI w/wo 12/8/18 ([facility]): Multiple foci of T2 signal abnormalities associated with serpiginous vessels as described, which may represent cavernomatosis with associated DVA's. Lesions are unusually hemispheric as no lesions are seen within the left cerebral hemisphere. While findings may represent familial versus sporadic cavernomatosis, radiation induced cavernomatosis in the appropriate clinical setting may be considered. Other differential diagnoses of vascular malformations are not excluded. Findings are not consistent with neurosarcoidosis or lymphoproliferative abnormality. \par \par CTH 7/15/18 ([place], [place]): Gyriform calcifications in the R temporooccipital regions. Multiple subcentimeter hyperdense lesions in the R cerebral hemisphere. No significant edema or midline shift. \par \par MRI w/wo 7/14/18 ([place], [place]): Innumerable lesions throughout R supratentorial hemisphere without evidence of edema or mass effect. Diffuse mild enhancement throughout R supratentorial hemisphere. Ddx: cav mal, infectious, less likely Sturge Nicolas or petechial hemorrhage.\par \par MRA H/N w/wo 7/15/18 ([place], [place]): normal\par \par Cerebral angiogram 7/17/18 ([place], [place]): no aneurysm or AVM \par \par NEUROPHYSIOLOGY:\par cvEEG 7/16 - 7/17/18 ([place], [place]): normal awake and asleep, no IED\par \par NEUROPSYCHOLOGY: \par none

## 2021-10-19 NOTE — ASSESSMENT
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 Will continue to increase CNB dosage; lower LEV/LCM prn for fatigue. Pt not interested in surgical options unless sz freedom guaranteed. All questions and concerns answered and addressed in detail to pt and 's complete satisfaction. Patient and her  verbalized understanding and agreed to plan.\par \par \par - Start CNB. Following titration schedule printed and provided to pt. Educated pt on side effects, including monitoring for rash daily. Stop taking CNB and call office immediately if rash appears. \par wk 1-2: 12.5mg daily  \par wk 3-4: 25mg daily \par wk 5-6: 50mg daily \par wk 7-8: 100mg daily (current dosage)\par wk 9-10: 150mg daily\par wk 11 and onward: 200mg daily\par \par - continue LEV ER 1000mg BID; decrease dose if report of drowsiness\par - continue LCM 150mg BID; decrease dose if report of drowsiness\par - cont CLZ ODT 1mg prn aura\par - cont folic acid 4mg daily (prefers to stay on 4mg even though no plan to conceive)\par - pt not driving\par - f/u in 3 months\par \par \par All relevant epilepsy AAN quality care measures were addressed and discussed with the patient and her .\par \par More than 50% of time spent counseling and educating patient and her  about epilepsy specific safety issues including AED side effects and interactions, alcohol consumption, sleep deprivation, risks and driving privileges associated with the [place] Guidelines (pt not driving), how treatment may affect contraction and pregnancy, death related to seizures/SUDEP, seizure 1st aid and risks. Patient and  are educated on seizure precautions, including no driving, no operating heavy machinery, no unsupervised swimming or bathing, no unsupervised use of stove, no climbing heights, and no unsupervised use of sharp objects.

## 2021-11-02 ENCOUNTER — NON-APPOINTMENT (OUTPATIENT)
Age: 41
End: 2021-11-02

## 2021-11-15 DIAGNOSIS — G40.802 OTHER EPILEPSY, NOT INTRACTABLE, W/OUT STATUS EPILEPTICUS: ICD-10-CM

## 2021-11-15 RX ORDER — CENOBAMATE 150-200 MG
14 X 150 MG & KIT ORAL
Qty: 28 | Refills: 0 | Status: COMPLETED | COMMUNITY
Start: 2021-08-17 | End: 2021-11-15

## 2022-01-04 ENCOUNTER — APPOINTMENT (OUTPATIENT)
Dept: OPHTHALMOLOGY | Facility: CLINIC | Age: 42
End: 2022-01-04
Payer: MEDICAID

## 2022-01-04 ENCOUNTER — NON-APPOINTMENT (OUTPATIENT)
Age: 42
End: 2022-01-04

## 2022-01-04 PROCEDURE — 92004 COMPRE OPH EXAM NEW PT 1/>: CPT

## 2022-01-04 PROCEDURE — 92015 DETERMINE REFRACTIVE STATE: CPT

## 2022-01-13 ENCOUNTER — APPOINTMENT (OUTPATIENT)
Dept: OPHTHALMOLOGY | Facility: CLINIC | Age: 42
End: 2022-01-13

## 2022-01-24 ENCOUNTER — APPOINTMENT (OUTPATIENT)
Dept: NEUROLOGY | Facility: CLINIC | Age: 42
End: 2022-01-24
Payer: MEDICAID

## 2022-01-24 VITALS
OXYGEN SATURATION: 99 % | SYSTOLIC BLOOD PRESSURE: 125 MMHG | HEIGHT: 64 IN | BODY MASS INDEX: 27.31 KG/M2 | WEIGHT: 160 LBS | TEMPERATURE: 97.3 F | DIASTOLIC BLOOD PRESSURE: 82 MMHG | HEART RATE: 59 BPM

## 2022-01-24 PROCEDURE — 99214 OFFICE O/P EST MOD 30 MIN: CPT

## 2022-01-24 NOTE — HISTORY OF PRESENT ILLNESS
[FreeTextEntry1] : LAST OFFICE VISIT: 10/18/21\par \par PCP: Dr. Benson\par  nickname: Nora \par \par INTERIM HISTORY:\par Pt today accompanied by . Developed blanchable and pruritic rash in trunk and arms a week after increasing CNB from 150mg QHS to 200mg QHS. No facial edema, angioedema, dyspnea, dysphagia. PCP prescribed topical cream and saw dermatologist. CNB temporarily stopped and LEV ER increased from 1000mg BID to 1500mg BID. CNB resumed ~1.5wks later at 100mg QHS. Rash resolved. No sz.\par \par CURRENT ASM:\par CNB 100mg QHS – started 8/2021\par LEV ER 1500mg BID – started 2015; level 65.1 on 4/5 on 1500mg BID\par LCM 150mg BID – started 7/2018; level 13.9 on 4/5 on 200mg BID\par CZP ODT 2mg prn – started 8/2018\par \par \par PRIOR EPILEPSY HISTORY:\par 8/7/18: This is a 38 year-old RH English female with no significant PMH who is self referred to establish care for epilepsy. Pt today is accompanied by her . Pt was previously followed by neurologist Dr. Chuck Davis. Pt's  provided most of history since pt speaks limited English.\par \par Onset of sz at age 25, during the 3rd month of pregnancy. Pt was in [facility] at the time. She was not started on AED, nor was she started on AED after her second sz at 9th month of pregnancy. After delivery, pt was started on CBZ. She was sz free on CBZ approximately from [id number]. Doctor in [facility] tapered her off of CBZ. When pt unfortunately sustained another sz, she was put back on CBZ. \par \par Pt immig[place] to the [place] around [place]. At the time, she was planning for second pregnancy, so CBZ was switched to LEV 500mg BID. Next seizure occurred on 8/2016, likely triggered by stress. LEV was increased 1gm BID. When pt was sz free for several years on this dosing, LEV was decreased back to 500mg BID. Unfortunately, with lowered dose of LEV, sz recurred on 7/14/18, while pt was in [place]. She was admitted to [facility]. Head imaging found innumerable lesions throughout R supratentorial hemisphere without focal enhancement, edema, or mass effect. Ddx: cav mal, infectious, less likely Sturge Nicolas or petechial hemorrhage. MRA H/N and cerebral angiogram were unremarkable. Pt and  were not aware of this finding. At discharge, LCM 50mg BID was added to LEV 500mg BID.\par \par === 11/8/18 ===\par Pt today is accompanied by her . Clarified that she is really just only LEV 500mg BID (not 1gm BID) and LCM 50mg BID. She was sz free for several yrs on LEV 1gm BID. Sz recurred after LEV was lowered to 500mg BID. Has not had any further sz since last visit. CURRENT AEDs: LEV 500mg BID - started 2015; LCM 50mg BID - started 7/2018; CLZ ODT 0.5mg prn - started 8/2018.\par \par === 2/19/19 ===\par Pt today is accompanied by her . At the last visit, plan was to cross titrate LEV 500mg BID and LCM 50mg BID to LEV 1gm BID. On 11/13, pt was on LEV 1gm BID and LCM 50mg qHS when she had a typical aura. Pt took CLZ ODT. On 12/24, pt was only on LEV 1gm BID and again had another aura, but this time was very mild. She again took CLZ ODT and called the on-call epilepsy fellow, who instructed pt to go back to LEV 500mg BID and LCM 50mg BID. \par \par Pt also saw Dr. 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 Offered cerebral angiography, but pt said she had it done in [place]. She will be bringing the imaging to Dr. Santo once she obtains a copy. Will f/u Dr. Santo and repeat image in a yr. CURRENT AEDs: LEV 500mg BID - started 2015; LCM 50mg BID - started 7/2018; CLZ ODT 0.5mg prn - started 8/2018.\par \par === 6/3/19 ===\par Pt today is accompanied by her . At the last visit, planned to taper off LCM 50mg BID and increase LEV from 500mg BID to 2000mg BID. Three to four days after coming off of LCM, pt had a breakthrough seizure, needed CZP ODT 1mg x2. Pt now on LCM 50mg BID and LEV 1000mg BID. No further sz on this duo-therapy. CURRENT AEDs: LEV 1000mg BID - started 2015, LCM 50mg BID - started 7/2018, CLZ ODT 1mg tabs prn - started 8/2018.\par \par === 9/11/19 ===\par Since last visit, pt has had 4 seizures that started with buzzing noise, visual aura in L eye, nausea and tachycardia. All of which were aborted with CZP ODT. CURRENT AEDs: LEV 1000mg BID - started 2015\par LCM 50mg BID - started 7/2018; CLZ ODT 1mg tabs prn - started 8/2018.\par \par === 11/6/19 ===\par Pt today is accompanied by her . At the last visit, increased LCM from 50mg BID to 100mg BID, which pt did. She was asked to change LEV IR 1gm BID to ER 1gm BID, which pt did not do. Hasn't had any sz since last visit. Saw Dr. Santo; awaiting MRI brain w/wo. CURRENT AEDs: LEV IR 1000mg BID - started 2015; LCM 100mg BID - started 7/2018; CLZ ODT 1mg tabs prn - started 8/2018.\par \par === 1/8/20 ===\par Pt today is accompanied by her . Since the last visit, has had two visual aura (seeing stars) around menstrual cycle, aborted with CZP. Sz threshold lowered by sleep deprivation. Hasn't gotten blood level of AEDs as instructed at the last visit. Still taking LEV IR and not ER because pt does not want to toss the remaining IR tablets; has a month of supply left. Saw Dr. Santo. MRI 11/27/19 stable compared to 12/8/18. No evidence for edema or hemorrhage. Repeat MRI in a yr. CURRENT AEDs: LEV IR 1000mg BID - started 2015; LCM 100mg BID - started 7/2018; CLZ ODT 1mg tabs prn - started 8/2018.\par \par === 6/2/20 ===\par Had a very small aura early 4/2020, aborted with CLZ x2. LCM 150mg BID increased to 200mg BID by Dr. Navarro on 5/5. No further sz. Pt and  expressed that they are no longer actively trying to conceive. CURRENT AEDs: LEV ER 1000mg BID - started 2015; LCM 200mg BID - started 7/2018; CLZ ODT 1mg tabs prn - started 8/2018.\par \par === 8/13/20 ===\par Increased LEV from 1000mg BID to 1500mg BID at the last visit. Had two focal seizures, aborted with CZP. Most breakthrough seizures occur around menses.\par \par === 9/10/20 ===\par No seizure activity noted since before last visit on 8/13/20. She reports that she had to stop taking the clobazam she was prescribed at her last visit due to the excessive drowsiness it caused. Tired on 5mg qHS; intolerable at 10mg qHS. She reports feeling well today and that her sleep has improved. Pending elective EMU next week. CURRENT AEDs: LEV ER 1500mg BID, LCM 200mg BID, CZP ODT 1mg tabs prn - started 8/2018.\par \par === 10/9/20 ===\par Pt today accompanied by . Did not show up for EMU due to family emergency. Tried CLB at 0.25mg, but remains very sleepy. Had one focal aware sz since last visit. CURRENT AEDs: LEV ER 1500mg BID - started 2015; level 51.4 on 8/13/20; LCM 200mg BID - started 7/2018; level 15.6 on 8/13/20; CZP ODT 1mg tabs prn - started 8/2018.\par \par === 1/5/21 ===\par Pt today accompanied by . At the last visit, started on LTG; tolerating well. Had one aura on 11/26, while on a low dose of LTG at 50mg BID, but no further sz since. Reports isolated right sided tinnitus without any other sx’s (not habitual aura). Cancelled elective EMU due to concern for COVID. Saw Dr. Santo in Nov, who ordered repeat MRI w/wo 12/9, demonstrating stable right panhemispheric cav mal without significant change from prior images. CURRENT AEDs: LEV ER 1500mg BID - started 2015; level 51.4 on 8/13/20; LCM 200mg BID - started 7/2018; level 15.6 on 8/13/20; LTG 100mg BID – started 10/2020; CZP ODT 1mg tabs prn - started 8/2018.\par \par === 4/5/21 ===\par Pt today accompanied by . Did not have any sz on LTG 100mg BID, but very sleepy. Therefore, self-decreased LTG to 100mg daily. Had 1 mild focal aware sz on 3/2, but a bigger focal aware sz on 3/25 with visual aura and severe nausea. CURRENT AEDs: LEV ER 1500mg BID - started 2015; level 51.4 on 8/13/20; LCM 200mg BID - started 7/2018; level 15.6 on 8/13/20; LTG 100mg daily – started 10/2020, very sleepy on 100mg BID; CZP ODT 1mg tabs prn - started 8/2018.\par \par === 5/18/21 ===\par Pt today accompanied by . Did not tolerate LTG ER 150mg QHS due to daytime drowsiness. Self-decreased to IR 100mg QHS. No sz since last visit. Had change of mind about surgery; amenable if needed. Got her 2 doses of COVID-19 vaccine. CURRENT AEDs: LEV ER 1500mg BID - started 2015; level 65.1 on 4/5; LCM 200mg BID - started 7/2018; level 13.9 on 4/5; LTG IR 100mg QHS – started 10/2020, lethargic on 150mg QHS, level 2.7 on 4/5; CZP ODT 2mg prn - started 8/2018.\par \par === 8/17/21 ===\par Pt today accompanied by . One focal sz since last visit. Reports hearing voices or feels like someone is in the room after the last sz. Pt has had this many years ago after a cluster of seizures. Symptoms self-resolved after a few months. Pt was amenable to surgery at the last visit, but after explaining to the pt that surgery is unable to guarantee sz freedom, pt no longer interested in surgical options. CURRENT AEDs: LEV ER 1500mg BID - started 2015; level 65.1 on 4/5; LCM 200mg BID - started 7/2018; level 13.9 on 4/5; LTG ER 100mg QHS – started 10/2020, lethargic on 150mg QHS, level 2.7 on 4/5; CZP ODT 2mg prn - started 8/2018.\par \par === 10/18/21 ===\par Pt today accompanied by . Tapered off of LTG. Seizures responding very well to CNB. Called 9/28/21 and reported fatigue on multiple AEDs. Instruction given to slowly lower LEV ER 1500mg BID to 100mg BID and LCM 200mg BID to 150mg BID. One mild focal aware sz as pt decreased the 2 AEDs, but no further sz since. CURRENT ASM: CNB 100mg QHS – started 8/2021; LEV ER 1000mg BID – started 2015; level 65.1 on 4/5 on 1500mg BID; LCM 150mg BID – started 7/2018; level 13.9 on 4/5 on 200mg BID; CZP ODT 2mg prn – started 8/2018.\par \par \par SEIZURE DESCRIPTION AND TYPE:\par Type #1\par Severity: focal sz, both aware and impaired (unclear if progresses to true BL tonic clonic sz)\par Onset: 26yo\par Quality & associated signs/symptoms: Prolonged aura of nausea +/- vomiting, geographic shapes (eg. stars, bubbles) in her L visual field -> loss of awareness, head turning/jerking toward the L, tonic-clonic activity in L arm and leg -> postictal confusion, L Miguel Ángel's. Pt developed postictal psychosis (auditory hallucination, paranoia) once after a sz. 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 Patient was a product of normal pregnancy and delivery. 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 Other punctate cavernous malformations in the septum pellucidum, corpus callosum and infratentorial compartment. 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 No change since 12/7/2018. 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 \par \par MRI w/wo 12/8/18 ([facility]): Multiple foci of T2 signal abnormalities associated with serpiginous vessels as described, which may represent cavernomatosis with associated DVA's. Lesions are unusually hemispheric as no lesions are seen within the left cerebral hemisphere. While findings may represent familial versus sporadic cavernomatosis, radiation induced cavernomatosis in the appropriate clinical setting may be considered. Other differential diagnoses of vascular malformations are not excluded. Findings are not consistent with neurosarcoidosis or lymphoproliferative abnormality. \par \par CTH 7/15/18 ([place], [place]): Gyriform calcifications in the R temporooccipital regions. Multiple subcentimeter hyperdense lesions in the R cerebral hemisphere. No significant edema or midline shift. \par \par MRI w/wo 7/14/18 ([place], [place]): Innumerable lesions throughout R supratentorial hemisphere without evidence of edema or mass effect. Diffuse mild enhancement throughout R supratentorial hemisphere. Ddx: cav mal, infectious, less likely Sturge Nicolas or petechial hemorrhage.\par \par MRA H/N w/wo 7/15/18 ([place], [place]): normal\par \par Cerebral angiogram 7/17/18 ([place], [place]): no aneurysm or AVM \par \par NEUROPHYSIOLOGY:\par cvEEG 7/16 - 7/17/18 ([place], [place]): normal awake and asleep, no IED\par \par NEUROPSYCHOLOGY: \par none

## 2022-01-24 NOTE — CONSULT LETTER
[Dear  ___] : Dear  [unfilled], [Sincerely,] : Sincerely, [FreeTextEntry1] : I had the pleasure of seeing your patient, JERRY BUTCHER, in my office today. Please see my note below. \par \par If you have any questions, please do not hesitate to contact me.  [FreeTextEntry3] : Lluvia Betancourt MD\par Director, Epilepsy/EMU\par [facility] \par [facility] [place]\par [address] [place] [address] \par Tel: [phone]; Fax: [phone]

## 2022-01-24 NOTE — ASSESSMENT
[FreeTextEntry1] : JERRY BUTCHER is a 42 year-old RH female w/ numerous R>L hemisphere cav mal's (follows Dr. Santo) and DVAs who presented for followup for focal epilepsy characterized by focal aware and impaired awareness sz's (unclear if progresses to true BL tonic clonic).\par \par Sz free on CNB 100mg QHS, LEV and LCM. Developed rash on CNB 150mg QHS. Pt not interested in surgical options unless sz freedom guaranteed. All questions and concerns answered and addressed in detail to pt and 's complete satisfaction. Patient and her  verbalized understanding and agreed to plan.\par \par \par - decrease LEV ER 1500mg BID: 1000/1500mg x2 wks -> 1000mg BID\par - continue CNB 100mg QHS\par - continue LCM 150mg BID\par - cont CLZ ODT 1mg prn aura\par - cont folic acid 4mg daily (prefers to stay on 4mg even though no plan to conceive)\par - pt not driving\par - f/u in 3 months\par \par \par All relevant epilepsy AAN quality care measures were addressed and discussed with the patient and her .\par \par More than 50% of time spent counseling and educating patient and her  about epilepsy specific safety issues including AED side effects and interactions, alcohol consumption, sleep deprivation, risks and driving privileges associated with the [place] Guidelines (pt not driving), how treatment may affect contraction and pregnancy, death related to seizures/SUDEP, seizure 1st aid and risks. Patient and  are educated on seizure precautions, including no driving, no operating heavy machinery, no unsupervised swimming or bathing, no unsupervised use of stove, no climbing heights, and no unsupervised use of sharp objects.

## 2022-04-08 ENCOUNTER — RX RENEWAL (OUTPATIENT)
Age: 42
End: 2022-04-08

## 2022-04-27 ENCOUNTER — NON-APPOINTMENT (OUTPATIENT)
Age: 42
End: 2022-04-27

## 2022-04-28 ENCOUNTER — RX RENEWAL (OUTPATIENT)
Age: 42
End: 2022-04-28

## 2022-06-21 ENCOUNTER — APPOINTMENT (OUTPATIENT)
Dept: NEUROLOGY | Facility: CLINIC | Age: 42
End: 2022-06-21
Payer: MEDICAID

## 2022-06-21 VITALS
TEMPERATURE: 98 F | WEIGHT: 160 LBS | HEART RATE: 65 BPM | BODY MASS INDEX: 27.31 KG/M2 | OXYGEN SATURATION: 99 % | HEIGHT: 64 IN | SYSTOLIC BLOOD PRESSURE: 109 MMHG | DIASTOLIC BLOOD PRESSURE: 73 MMHG

## 2022-06-21 DIAGNOSIS — Z51.81 ENCOUNTER FOR THERAPEUTIC DRUG LVL MONITORING: ICD-10-CM

## 2022-06-21 PROCEDURE — 99214 OFFICE O/P EST MOD 30 MIN: CPT

## 2022-08-08 NOTE — PHYSICAL EXAM
[FreeTextEntry1] : GENERAL PHYSICAL EXAM:\par GEN: well appearing, well nourished, no distress, normal affect, cooperative \par HEENT:  NCAT, OP clear\par EYES: sclera white, conjunctiva clear, no nystagmus\par NECK: supple\par CV: nl S1/S2, RRR, no murmur     		\par PULM: CTAB, no wheezing\par GI: soft ABD, +BS, NT, ND\par EXT: peripheral pulse intact, no edema, no clubbing, no cyanosis\par MSK: muscle tone and strength normal\par SKIN: warm, dry, no rash or lesion on exposed skin \par \par NEUROLOGICAL EXAM:\par Mental Status\par Orientation: alert and oriented to person, place, time, and situation \par Language: clear and fluent, intact comprehension and repetition, intact naming and reading\par \par Cranial Nerves\par II: full visual fields intact \par III, IV, VI: PERRL, EOMI\par V, VII: facial sensation and movement intact and symmetric \par VIII: hearing intact \par IX, X: uvula midline, soft palate elevates normally \par XI: BL shoulder shrug intact \par XII: tongue midline\par \par Motor\par Shoulder abd: 5+ (R), 5+ (L)\par EF/EE: 5+ (R), 5+ (L)\par WF/WE: 5+ (R), 5+ (L)\par hand : 5+ (R), 5+ (L)\par HF/HE: 5+ (R), 5+ (L)\par KF/KE: 5+ (R), 5+ (L)\par DF/PF: 5+ (R), 5+ (L)                \par Tone and bulk are normal in upper and lower limbs\par No pronator drift\par \par Sensation\par Intact to light touch, vibration, proprioception, pinprick, and temperature in all 4 EXTs\par \par Reflex\par 2+ in BL biceps, triceps, brachioradiali, patella, ankle                                    \par Plantar responses downward bilaterally\par \par Coordination\par Normal FTN, HTS \par BL HARLEY intact\par \par Gait\par Normal stance, stride, and pivot turn\par Tandem walk intact\par Negative Romberg\par \par 
on the discharge service for the patient. I have reviewed and made amendments to the documentation where necessary.

## 2022-09-21 ENCOUNTER — APPOINTMENT (OUTPATIENT)
Dept: NEUROLOGY | Facility: CLINIC | Age: 42
End: 2022-09-21

## 2022-09-21 VITALS
SYSTOLIC BLOOD PRESSURE: 104 MMHG | BODY MASS INDEX: 26.66 KG/M2 | DIASTOLIC BLOOD PRESSURE: 70 MMHG | WEIGHT: 160 LBS | HEIGHT: 65 IN | OXYGEN SATURATION: 98 % | HEART RATE: 67 BPM

## 2022-09-21 PROCEDURE — 99214 OFFICE O/P EST MOD 30 MIN: CPT

## 2022-09-21 NOTE — ASSESSMENT
[FreeTextEntry1] : JERRY BUTCHER is a 42 year-old RH female w/ numerous R>L hemisphere cav mal's (follows Dr. Santo) and DVAs who presented for followup for focal epilepsy characterized by focal aware and impaired awareness sz's (unclear if progresses to true BL tonic clonic). Personally reviewed all images, labs, EEG and other studies. \par \par Continues to have FA sz. Will proceed with caution to increase CNB; h/o rash as CNB 150mg QHS increased to 200mg QHS. Consider resuming CBZ in future. Pt not interested in surgical options unless sz freedom guaranteed. All questions and concerns answered and addressed in detail to pt and 's complete satisfaction. Patient and her  verbalized understanding and agreed to plan.\par \par - increase CNB 100mg QHS to 150mg QHS\par - continue LEV ER 1000mg BID\par - continue LCM 150mg BID\par - cont CLZ ODT 1-2mg prn aura\par - cont folic acid 4mg daily (prefers to stay on 4mg even though no plan to conceive)\par - pt not driving\par - f/u in 3 months\par \par \par All relevant epilepsy AAN quality care measures were addressed and discussed with the patient and her .\par \par More than 50% of time spent counseling and educating patient and her  about epilepsy specific safety issues including AED side effects and interactions, alcohol consumption, sleep deprivation, risks and driving privileges associated with the [place] Guidelines (pt not driving), how treatment may affect contraction and pregnancy, death related to seizures/SUDEP, seizure 1st aid and risks. Patient and  are educated on seizure precautions, including no driving, no operating heavy machinery, no unsupervised swimming or bathing, no unsupervised use of stove, no climbing heights, and no unsupervised use of sharp objects.

## 2022-09-21 NOTE — HISTORY OF PRESENT ILLNESS
[FreeTextEntry1] : LAST OFFICE VISIT: 1/24/22\par \par PCP: Dr. Benson\par  nickname: Nora \par \par INTERIM HISTORY:\par Pt today accompanied by . One FA sz in March and another one in April.\par \par CURRENT ASM:\par CNB 100mg QHS – started 8/2021, rash on 200mg QHS\par LEV ER 1000mg BID – started 2015; level 65.1 on 4/5 on 1500mg BID\par LCM 150mg BID – started 7/2018; level 13.9 on 4/5 on 200mg BID\par CZP ODT 2mg prn – started 8/2018\par \par \par PRIOR HISTORY:\par 8/7/18: This is a 38 year-old RH Citizen of [place] female with no significant PMH who is self-referred to establish care for epilepsy. Pt today is accompanied by her . Pt was previously followed by neurologist Dr. Chuck Davis. Pt's  provided most of history since pt speaks limited English.\par \par Onset of sz at age 25, during the 3rd month of pregnancy. Pt was in [facility] at the time. She was not started on AED, nor was she started on AED after her second sz at 9th month of pregnancy. After delivery, pt was started on CBZ. She was sz free on CBZ approximately from 1977-4700. Doctor in [facility] tapered her off of CBZ. When pt unfortunately sustained another sz, she was put back on CBZ. \par \par Pt immig[place] to the [place] around [place]. At the time, she was planning for second pregnancy, so CBZ was switched to LEV 500mg BID. Next seizure occurred on 8/2016, likely triggered by stress. LEV was increased 1gm BID. When pt was sz free for several years on this dosing, LEV was decreased back to 500mg BID. Unfortunately, with lowered dose of LEV, sz recurred on 7/14/18, while pt was in [place]. She was admitted to [facility]. Head imaging found innumerable lesions throughout R supratentorial hemisphere without focal enhancement, edema, or mass effect. Ddx: cav mal, infectious, less likely Sturge Nicolas or petechial hemorrhage. MRA H/N and cerebral angiogram were unremarkable. Pt and  were not aware of this finding. At discharge, LCM 50mg BID was added to LEV 500mg BID.\par \par === 11/8/18 ===\par Pt today is accompanied by her . Clarified that she is really just only LEV 500mg BID (not 1gm BID) and LCM 50mg BID. She was sz free for several yrs on LEV 1gm BID. Sz recurred after LEV was lowered to 500mg BID. Has not had any further sz since last visit. CURRENT AEDs: LEV 500mg BID - started 2015; LCM 50mg BID - started 7/2018; CLZ ODT 0.5mg prn - started 8/2018.\par \par === 2/19/19 ===\par Pt today is accompanied by her . At the last visit, plan was to cross titrate LEV 500mg BID and LCM 50mg BID to LEV 1gm BID. On 11/13, pt was on LEV 1gm BID and LCM 50mg qHS when she had a typical aura. Pt took CLZ ODT. On 12/24, pt was only on LEV 1gm BID and again had another aura, but this time was very mild. She again took CLZ ODT and called the on-call epilepsy fellow, who instructed pt to go back to LEV 500mg BID and LCM 50mg BID. \par \par Pt also saw Dr. 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 Offered cerebral angiography, but pt said she had it done in [place]. She will be bringing the imaging to Dr. Santo once she obtains a copy. Will f/u Dr. Santo and repeat image in a yr. CURRENT AEDs: LEV 500mg BID - started 2015; LCM 50mg BID - started 7/2018; CLZ ODT 0.5mg prn - started 8/2018.\par \par === 6/3/19 ===\par Pt today is accompanied by her . At the last visit, planned to taper off LCM 50mg BID and increase LEV from 500mg BID to 2000mg BID. Three to four days after coming off of LCM, pt had a breakthrough seizure, needed CZP ODT 1mg x2. Pt now on LCM 50mg BID and LEV 1000mg BID. No further sz on this duo-therapy. CURRENT AEDs: LEV 1000mg BID - started 2015, LCM 50mg BID - started 7/2018, CLZ ODT 1mg tabs prn - started 8/2018.\par \par === 9/11/19 ===\par Since last visit, pt has had 4 seizures that started with buzzing noise, visual aura in L eye, nausea and tachycardia. All of which were aborted with CZP ODT. CURRENT AEDs: LEV 1000mg BID - started 2015\par LCM 50mg BID - started 7/2018; CLZ ODT 1mg tabs prn - started 8/2018.\par \par === 11/6/19 ===\par Pt today is accompanied by her . At the last visit, increased LCM from 50mg BID to 100mg BID, which pt did. She was asked to change LEV IR 1gm BID to ER 1gm BID, which pt did not do. Hasn't had any sz since last visit. Saw Dr. Santo; awaiting MRI brain w/wo. CURRENT AEDs: LEV IR 1000mg BID - started 2015; LCM 100mg BID - started 7/2018; CLZ ODT 1mg tabs prn - started 8/2018.\par \par === 1/8/20 ===\par Pt today is accompanied by her . Since the last visit, has had two visual aura (seeing stars) around menstrual cycle, aborted with CZP. Sz threshold lowered by sleep deprivation. Hasn't gotten blood level of AEDs as instructed at the last visit. Still taking LEV IR and not ER because pt does not want to toss the remaining IR tablets; has a month of supply left. Saw Dr. Santo. MRI 11/27/19 stable compared to 12/8/18. No evidence for edema or hemorrhage. Repeat MRI in a yr. CURRENT AEDs: LEV IR 1000mg BID - started 2015; LCM 100mg BID - started 7/2018; CLZ ODT 1mg tabs prn - started 8/2018.\par \par === 6/2/20 ===\par Had a very small aura early 4/2020, aborted with CLZ x2. LCM 150mg BID increased to 200mg BID by Dr. Navarro on 5/5. No further sz. Pt and  expressed that they are no longer actively trying to conceive. CURRENT AEDs: LEV ER 1000mg BID - started 2015; LCM 200mg BID - started 7/2018; CLZ ODT 1mg tabs prn - started 8/2018.\par \par === 8/13/20 ===\par Increased LEV from 1000mg BID to 1500mg BID at the last visit. Had two focal seizures, aborted with CZP. Most breakthrough seizures occur around menses.\par \par === 9/10/20 ===\par No seizure activity noted since before last visit on 8/13/20. She reports that she had to stop taking the clobazam she was prescribed at her last visit due to the excessive drowsiness it caused. Tired on 5mg qHS; intolerable at 10mg qHS. She reports feeling well today and that her sleep has improved. Pending elective EMU next week. CURRENT AEDs: LEV ER 1500mg BID, LCM 200mg BID, CZP ODT 1mg tabs prn - started 8/2018.\par \par === 10/9/20 ===\par Pt today accompanied by . Did not show up for EMU due to family emergency. Tried CLB at 0.25mg, but remains very sleepy. Had one focal aware sz since last visit. CURRENT AEDs: LEV ER 1500mg BID - started 2015; level 51.4 on 8/13/20; LCM 200mg BID - started 7/2018; level 15.6 on 8/13/20; CZP ODT 1mg tabs prn - started 8/2018.\par \par === 1/5/21 ===\par Pt today accompanied by . At the last visit, started on LTG; tolerating well. Had one aura on 11/26, while on a low dose of LTG at 50mg BID, but no further sz since. Reports isolated right sided tinnitus without any other sx’s (not habitual aura). Cancelled elective EMU due to concern for COVID. Saw Dr. Santo in Nov, who ordered repeat MRI w/wo 12/9, demonstrating stable right panhemispheric cav mal without significant change from prior images. CURRENT AEDs: LEV ER 1500mg BID - started 2015; level 51.4 on 8/13/20; LCM 200mg BID - started 7/2018; level 15.6 on 8/13/20; LTG 100mg BID – started 10/2020; CZP ODT 1mg tabs prn - started 8/2018.\par \par === 4/5/21 ===\par Pt today accompanied by . Did not have any sz on LTG 100mg BID, but very sleepy. Therefore, self-decreased LTG to 100mg daily. Had 1 mild focal aware sz on 3/2, but a bigger focal aware sz on 3/25 with visual aura and severe nausea. CURRENT AEDs: LEV ER 1500mg BID - started 2015; level 51.4 on 8/13/20; LCM 200mg BID - started 7/2018; level 15.6 on 8/13/20; LTG 100mg daily – started 10/2020, very sleepy on 100mg BID; CZP ODT 1mg tabs prn - started 8/2018.\par \par === 5/18/21 ===\par Pt today accompanied by . Did not tolerate LTG ER 150mg QHS due to daytime drowsiness. Self-decreased to IR 100mg QHS. No sz since last visit. Had change of mind about surgery; amenable if needed. Got her 2 doses of COVID-19 vaccine. CURRENT AEDs: LEV ER 1500mg BID - started 2015; level 65.1 on 4/5; LCM 200mg BID - started 7/2018; level 13.9 on 4/5; LTG IR 100mg QHS – started 10/2020, lethargic on 150mg QHS, level 2.7 on 4/5; CZP ODT 2mg prn - started 8/2018.\par \par === 8/17/21 ===\par Pt today accompanied by . One focal sz since last visit. Reports hearing voices or feels like someone is in the room after the last sz. Pt has had this many years ago after a cluster of seizures. Symptoms self-resolved after a few months. Pt was amenable to surgery at the last visit, but after explaining to the pt that surgery is unable to guarantee sz freedom, pt no longer interested in surgical options. CURRENT AEDs: LEV ER 1500mg BID - started 2015; level 65.1 on 4/5; LCM 200mg BID - started 7/2018; level 13.9 on 4/5; LTG ER 100mg QHS – started 10/2020, lethargic on 150mg QHS, level 2.7 on 4/5; CZP ODT 2mg prn - started 8/2018.\par \par === 10/18/21 ===\par Pt today accompanied by . Tapered off of LTG. Seizures responding very well to CNB. Called 9/28/21 and reported fatigue on multiple AEDs. Instruction given to slowly lower LEV ER 1500mg BID to 100mg BID and LCM 200mg BID to 150mg BID. One mild focal aware sz as pt decreased the 2 AEDs, but no further sz since. CURRENT ASM: CNB 100mg QHS – started 8/2021; LEV ER 1000mg BID – started 2015; level 65.1 on 4/5 on 1500mg BID; LCM 150mg BID – started 7/2018; level 13.9 on 4/5 on 200mg BID; CZP ODT 2mg prn – started 8/2018.\par \par === 1/24/22 ===\par Pt today accompanied by . Developed blanchable and pruritic rash in trunk and arms a week after increasing CNB from 150mg QHS to 200mg QHS. No facial edema, angioedema, dyspnea, dysphagia. PCP prescribed topical cream and saw dermatologist. CNB temporarily stopped and LEV ER increased from 1000mg BID to 1500mg BID. CNB resumed ~1.5wks later at 100mg QHS. Rash resolved. No sz. CURRENT ASM: CNB 100mg QHS – started 8/2021; LEV ER 1500mg BID – started 2015; level 65.1 on 4/5 on 1500mg BID; LCM 150mg BID – started 7/2018; level 13.9 on 4/5 on 200mg BID; CZP ODT 2mg prn – started 8/2018.\par \par \par SEIZURE DESCRIPTION AND TYPE:\par Type #1\par Severity: focal sz, both aware and impaired (unclear if progresses to true BL tonic clonic sz)\par Onset: 26yo\par Quality & associated signs/symptoms: Prolonged aura of nausea +/- vomiting, geographic shapes (eg. stars, bubbles) in her L visual field -> loss of awareness, head turning/jerking toward the L, tonic-clonic activity in L arm and leg -> postictal confusion, L Miguel Ángel's. Pt developed postictal psychosis (auditory hallucination, paranoia) once after a sz. 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 Patient was a product of normal pregnancy and delivery. 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 Other punctate cavernous malformations in the septum pellucidum, corpus callosum and infratentorial compartment. 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 No change since 12/7/2018. 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 \par \par MRI w/wo 12/8/18 ([facility]): Multiple foci of T2 signal abnormalities associated with serpiginous vessels as described, which may represent cavernomatosis with associated DVA's. Lesions are unusually hemispheric as no lesions are seen within the left cerebral hemisphere. While findings may represent familial versus sporadic cavernomatosis, radiation induced cavernomatosis in the appropriate clinical setting may be considered. Other differential diagnoses of vascular malformations are not excluded. Findings are not consistent with neurosarcoidosis or lymphoproliferative abnormality. \par \par CTH 7/15/18 ([place], [place]): Gyriform calcifications in the R temporooccipital regions. Multiple subcentimeter hyperdense lesions in the R cerebral hemisphere. No significant edema or midline shift. \par \par MRI w/wo 7/14/18 ([place], [place]): Innumerable lesions throughout R supratentorial hemisphere without evidence of edema or mass effect. Diffuse mild enhancement throughout R supratentorial hemisphere. Ddx: cav mal, infectious, less likely Sturge Nicolas or petechial hemorrhage.\par \par MRA H/N w/wo 7/15/18 ([place], [place]): normal\par \par Cerebral angiogram 7/17/18 ([place], [place]): no aneurysm or AVM \par \par NEUROPHYSIOLOGY:\par cvEEG 7/16 - 7/17/18 ([place], [place]): normal awake and asleep, no IED\par \par NEUROPSYCHOLOGY: \par none

## 2022-09-21 NOTE — PHYSICAL EXAM
[FreeTextEntry1] : GENERAL PHYSICAL EXAM:\par GEN: no distress\par HEENT: NCAT, OP clear\par EYES: sclera white, conjunctiva clear, no nystagmus\par NECK: supple\par CV: RRR    		\par PULM: CTAB, no wheezing\par GI: soft ABD, +BS, NT, ND\par EXT: peripheral pulse intact, no cyanosis\par MSK: muscle tone and strength normal\par SKIN: warm, dry, no rash or lesion on exposed skin \par \par NEUROLOGICAL EXAM:\par Mental Status\par Orientation: alert and oriented to person, place, time, and situation \par Language: clear and fluent\par \par Cranial Nerves\par II: full visual fields intact \par III, IV, VI: PERRL, EOMI\par V, VII: facial sensation and movement intact and symmetric \par VIII: hearing intact \par IX, X: uvula midline, soft palate elevates normally \par XI: BL shoulder shrug intact \par XII: tongue midline\par \par Motor\par Shoulder abd: 5 (R), 5 (L)\par EF/EE: 5 (R), 5 (L)\par WF/WE: 5 (R), 5 (L)\par hand : 5 (R), 5 (L)\par HF/HE: 5 (R), 5 (L)\par KF/KE: 5 (R), 5 (L)\par DF/PF: 5 (R), 5 (L)                \par Tone and bulk are normal in upper and lower limbs\par No pronator drift\par \par Sensation\par Intact to light touch and pinprick in all 4 EXTs\par \par Reflex\par 2+ in BL biceps, triceps, brachioradialis, patella, ankle                                    \par Plantar responses downward bilaterally\par \par Coordination\par Normal FTN bilaterally\par \par Gait\par Normal stance, stride, and pivot turn\par \par

## 2022-09-21 NOTE — ASSESSMENT
[FreeTextEntry1] : JERRY BUTCHER is a 42 year-old RH female w/ numerous R>L hemisphere cav mal's (follows Dr. Santo) and DVAs who presented for followup for focal epilepsy characterized by focal aware and impaired awareness sz's (unclear if progresses to true BL tonic clonic). Personally reviewed all images, labs, EEG and other studies. \par \par Doing well. H/o rash with increasing of CNB 150mg QHS to 200mg QHS. Pt not interested in surgical options unless sz freedom guaranteed. All questions and concerns answered and addressed in detail to pt and 's complete satisfaction. Patient and her  verbalized understanding and agreed to plan.\par \par - continue CNB 150mg QHS\par - continue LEV ER 1000mg BID\par - continue LCM 150mg BID\par - cont CLZ ODT 1-2mg prn aura\par - cont folic acid 4mg daily (prefers to stay on 4mg even though no plan to conceive)\par - pt not driving\par - f/u in 3 months\par \par \par All relevant epilepsy AAN quality care measures were addressed and discussed with the patient and her .\par \par More than 50% of time spent counseling and educating patient and her  about epilepsy specific safety issues including AED side effects and interactions, alcohol consumption, sleep deprivation, risks and driving privileges associated with the [place] Guidelines (pt not driving), how treatment may affect contraction and pregnancy, death related to seizures/SUDEP, seizure 1st aid and risks. Patient and  are educated on seizure precautions, including no driving, no operating heavy machinery, no unsupervised swimming or bathing, no unsupervised use of stove, no climbing heights, and no unsupervised use of sharp objects.

## 2022-09-21 NOTE — HISTORY OF PRESENT ILLNESS
[FreeTextEntry1] : LAST OFFICE VISIT: 6/21/22\par \par PCP: Dr. Benson\par  nickname: Nora \par \par INTERIM HISTORY:\par Pt today accompanied by . At the last visit, CNB increased from 100mg QHS to 150mg QHS for FA sz. Since the increase, no further seizures. Tolerating ASMs well.\par \par CURRENT ASM:\par CNB 150mg QHS – started 8/2021, rash on 200mg QHS\par LEV ER 1000mg BID – started 2015; level 65.1 on 4/5 on 1500mg BID\par LCM 150mg BID – started 7/2018; level 13.9 on 4/5 on 200mg BID\par CZP ODT 2mg prn – started 8/2018\par \par \par PRIOR HISTORY:\par 8/7/18: This is a 38 year-old [place] female with no significant PMH who is self-referred to establish care for epilepsy. Pt today is accompanied by her . Pt was previously followed by neurologist Dr. Chuck Davis. Pt's  provided most of history since pt speaks limited English.\par \par Onset of sz at age 25, during the 3rd month of pregnancy. Pt was in [facility] at the time. She was not started on AED, nor was she started on AED after her second sz at 9th month of pregnancy. After delivery, pt was started on CBZ. She was sz free on CBZ approximately from [id number]. Doctor in [facility] tapered her off of CBZ. When pt unfortunately sustained another sz, she was put back on CBZ. \par \par Pt immig[place] to the [place] around [place]. At the time, she was planning for second pregnancy, so CBZ was switched to LEV 500mg BID. Next seizure occurred on 8/2016, likely triggered by stress. LEV was increased 1gm BID. When pt was sz free for several years on this dosing, LEV was decreased back to 500mg BID. Unfortunately, with lowered dose of LEV, sz recurred on 7/14/18, while pt was in [place]. She was admitted to [facility]. Head imaging found innumerable lesions throughout R supratentorial hemisphere without focal enhancement, edema, or mass effect. Ddx: cav mal, infectious, less likely Sturge Nicolas or petechial hemorrhage. MRA H/N and cerebral angiogram were unremarkable. Pt and  were not aware of this finding. At discharge, LCM 50mg BID was added to LEV 500mg BID.\par \par === 11/8/18 ===\par Pt today is accompanied by her . Clarified that she is really just only LEV 500mg BID (not 1gm BID) and LCM 50mg BID. She was sz free for several yrs on LEV 1gm BID. Sz recurred after LEV was lowered to 500mg BID. Has not had any further sz since last visit. CURRENT AEDs: LEV 500mg BID - started 2015; LCM 50mg BID - started 7/2018; CLZ ODT 0.5mg prn - started 8/2018.\par \par === 2/19/19 ===\par Pt today is accompanied by her . At the last visit, plan was to cross titrate LEV 500mg BID and LCM 50mg BID to LEV 1gm BID. On 11/13, pt was on LEV 1gm BID and LCM 50mg qHS when she had a typical aura. Pt took CLZ ODT. On 12/24, pt was only on LEV 1gm BID and again had another aura, but this time was very mild. She again took CLZ ODT and called the on-call epilepsy fellow, who instructed pt to go back to LEV 500mg BID and LCM 50mg BID. \par \par Pt also saw Dr. 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 Offered cerebral angiography, but pt said she had it done in [place]. She will be bringing the imaging to Dr. Santo once she obtains a copy. Will f/u Dr. Santo and repeat image in a yr. CURRENT AEDs: LEV 500mg BID - started 2015; LCM 50mg BID - started 7/2018; CLZ ODT 0.5mg prn - started 8/2018.\par \par === 6/3/19 ===\par Pt today is accompanied by her . At the last visit, planned to taper off LCM 50mg BID and increase LEV from 500mg BID to 2000mg BID. Three to four days after coming off of LCM, pt had a breakthrough seizure, needed CZP ODT 1mg x2. Pt now on LCM 50mg BID and LEV 1000mg BID. No further sz on this duo-therapy. CURRENT AEDs: LEV 1000mg BID - started 2015, LCM 50mg BID - started 7/2018, CLZ ODT 1mg tabs prn - started 8/2018.\par \par === 9/11/19 ===\par Since last visit, pt has had 4 seizures that started with buzzing noise, visual aura in L eye, nausea and tachycardia. All of which were aborted with CZP ODT. CURRENT AEDs: LEV 1000mg BID - started 2015\par LCM 50mg BID - started 7/2018; CLZ ODT 1mg tabs prn - started 8/2018.\par \par === 11/6/19 ===\par Pt today is accompanied by her . At the last visit, increased LCM from 50mg BID to 100mg BID, which pt did. She was asked to change LEV IR 1gm BID to ER 1gm BID, which pt did not do. Hasn't had any sz since last visit. Saw Dr. Santo; awaiting MRI brain w/wo. CURRENT AEDs: LEV IR 1000mg BID - started 2015; LCM 100mg BID - started 7/2018; CLZ ODT 1mg tabs prn - started 8/2018.\par \par === 1/8/20 ===\par Pt today is accompanied by her . Since the last visit, has had two visual aura (seeing stars) around menstrual cycle, aborted with CZP. Sz threshold lowered by sleep deprivation. Hasn't gotten blood level of AEDs as instructed at the last visit. Still taking LEV IR and not ER because pt does not want to toss the remaining IR tablets; has a month of supply left. Saw Dr. Santo. MRI 11/27/19 stable compared to 12/8/18. No evidence for edema or hemorrhage. Repeat MRI in a yr. CURRENT AEDs: LEV IR 1000mg BID - started 2015; LCM 100mg BID - started 7/2018; CLZ ODT 1mg tabs prn - started 8/2018.\par \par === 6/2/20 ===\par Had a very small aura early 4/2020, aborted with CLZ x2. LCM 150mg BID increased to 200mg BID by Dr. Navarro on 5/5. No further sz. Pt and  expressed that they are no longer actively trying to conceive. CURRENT AEDs: LEV ER 1000mg BID - started 2015; LCM 200mg BID - started 7/2018; CLZ ODT 1mg tabs prn - started 8/2018.\par \par === 8/13/20 ===\par Increased LEV from 1000mg BID to 1500mg BID at the last visit. Had two focal seizures, aborted with CZP. Most breakthrough seizures occur around menses.\par \par === 9/10/20 ===\par No seizure activity noted since before last visit on 8/13/20. She reports that she had to stop taking the clobazam she was prescribed at her last visit due to the excessive drowsiness it caused. Tired on 5mg qHS; intolerable at 10mg qHS. She reports feeling well today and that her sleep has improved. Pending elective EMU next week. CURRENT AEDs: LEV ER 1500mg BID, LCM 200mg BID, CZP ODT 1mg tabs prn - started 8/2018.\par \par === 10/9/20 ===\par Pt today accompanied by . Did not show up for EMU due to family emergency. Tried CLB at 0.25mg, but remains very sleepy. Had one focal aware sz since last visit. CURRENT AEDs: LEV ER 1500mg BID - started 2015; level 51.4 on 8/13/20; LCM 200mg BID - started 7/2018; level 15.6 on 8/13/20; CZP ODT 1mg tabs prn - started 8/2018.\par \par === 1/5/21 ===\par Pt today accompanied by . At the last visit, started on LTG; tolerating well. Had one aura on 11/26, while on a low dose of LTG at 50mg BID, but no further sz since. Reports isolated right sided tinnitus without any other sx’s (not habitual aura). Cancelled elective EMU due to concern for COVID. Saw Dr. Santo in Nov, who ordered repeat MRI w/wo 12/9, demonstrating stable right panhemispheric cav mal without significant change from prior images. CURRENT AEDs: LEV ER 1500mg BID - started 2015; level 51.4 on 8/13/20; LCM 200mg BID - started 7/2018; level 15.6 on 8/13/20; LTG 100mg BID – started 10/2020; CZP ODT 1mg tabs prn - started 8/2018.\par \par === 4/5/21 ===\par Pt today accompanied by . Did not have any sz on LTG 100mg BID, but very sleepy. Therefore, self-decreased LTG to 100mg daily. Had 1 mild focal aware sz on 3/2, but a bigger focal aware sz on 3/25 with visual aura and severe nausea. CURRENT AEDs: LEV ER 1500mg BID - started 2015; level 51.4 on 8/13/20; LCM 200mg BID - started 7/2018; level 15.6 on 8/13/20; LTG 100mg daily – started 10/2020, very sleepy on 100mg BID; CZP ODT 1mg tabs prn - started 8/2018.\par \par === 5/18/21 ===\par Pt today accompanied by . Did not tolerate LTG ER 150mg QHS due to daytime drowsiness. Self-decreased to IR 100mg QHS. No sz since last visit. Had change of mind about surgery; amenable if needed. Got her 2 doses of COVID-19 vaccine. CURRENT AEDs: LEV ER 1500mg BID - started 2015; level 65.1 on 4/5; LCM 200mg BID - started 7/2018; level 13.9 on 4/5; LTG IR 100mg QHS – started 10/2020, lethargic on 150mg QHS, level 2.7 on 4/5; CZP ODT 2mg prn - started 8/2018.\par \par === 8/17/21 ===\par Pt today accompanied by . One focal sz since last visit. Reports hearing voices or feels like someone is in the room after the last sz. Pt has had this many years ago after a cluster of seizures. Symptoms self-resolved after a few months. Pt was amenable to surgery at the last visit, but after explaining to the pt that surgery is unable to guarantee sz freedom, pt no longer interested in surgical options. CURRENT AEDs: LEV ER 1500mg BID - started 2015; level 65.1 on 4/5; LCM 200mg BID - started 7/2018; level 13.9 on 4/5; LTG ER 100mg QHS – started 10/2020, lethargic on 150mg QHS, level 2.7 on 4/5; CZP ODT 2mg prn - started 8/2018.\par \par === 10/18/21 ===\par Pt today accompanied by . Tapered off of LTG. Seizures responding very well to CNB. Called 9/28/21 and reported fatigue on multiple AEDs. Instruction given to slowly lower LEV ER 1500mg BID to 100mg BID and LCM 200mg BID to 150mg BID. One mild focal aware sz as pt decreased the 2 AEDs, but no further sz since. CURRENT ASM: CNB 100mg QHS – started 8/2021; LEV ER 1000mg BID – started 2015; level 65.1 on 4/5 on 1500mg BID; LCM 150mg BID – started 7/2018; level 13.9 on 4/5 on 200mg BID; CZP ODT 2mg prn – started 8/2018.\par \par === 1/24/22 ===\par Pt today accompanied by . Developed blanchable and pruritic rash in trunk and arms a week after increasing CNB from 150mg QHS to 200mg QHS. No facial edema, angioedema, dyspnea, dysphagia. PCP prescribed topical cream and saw dermatologist. CNB temporarily stopped and LEV ER increased from 1000mg BID to 1500mg BID. CNB resumed ~1.5wks later at 100mg QHS. Rash resolved. No sz. CURRENT ASM: CNB 100mg QHS – started 8/2021; LEV ER 1500mg BID – started 2015; level 65.1 on 4/5 on 1500mg BID; LCM 150mg BID – started 7/2018; level 13.9 on 4/5 on 200mg BID; CZP ODT 2mg prn – started 8/2018.\par \par === 6/21/22 ===\par Pt today accompanied by . One FA sz in March and another one in April. CURRENT ASM: CNB 100mg QHS – started 8/2021, rash on 200mg QHS; LEV ER 1000mg BID – started 2015; level 65.1 on 4/5 on 1500mg BID; LCM 150mg BID – started 7/2018; level 13.9 on 4/5 on 200mg BID; CZP ODT 2mg prn – started 8/2018.\par \par \par SEIZURE DESCRIPTION AND TYPE:\par Type #1\par Severity: focal sz, both aware and impaired (unclear if progresses to true BL tonic clonic sz)\par Onset: 26yo\par Quality & associated signs/symptoms: Prolonged aura of nausea +/- vomiting, geographic shapes (eg. stars, bubbles) in her L visual field -> loss of awareness, head turning/jerking toward the L, tonic-clonic activity in L arm and leg -> postictal confusion, L Miguel Ángel's. Pt developed postictal psychosis (auditory hallucination, paranoia) once after a sz. 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 Patient was a product of normal pregnancy and delivery. 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 Other punctate cavernous malformations in the septum pellucidum, corpus callosum and infratentorial compartment. 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 No change since 12/7/2018. 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 \par \par MRI w/wo 12/8/18 ([facility]): Multiple foci of T2 signal abnormalities associated with serpiginous vessels as described, which may represent cavernomatosis with associated DVA's. Lesions are unusually hemispheric as no lesions are seen within the left cerebral hemisphere. While findings may represent familial versus sporadic cavernomatosis, radiation induced cavernomatosis in the appropriate clinical setting may be considered. Other differential diagnoses of vascular malformations are not excluded. Findings are not consistent with neurosarcoidosis or lymphoproliferative abnormality. \par \par CTH 7/15/18 ([place], [place]): Gyriform calcifications in the R temporooccipital regions. Multiple subcentimeter hyperdense lesions in the R cerebral hemisphere. No significant edema or midline shift. \par \par MRI w/wo 7/14/18 ([place], [place]): Innumerable lesions throughout R supratentorial hemisphere without evidence of edema or mass effect. Diffuse mild enhancement throughout R supratentorial hemisphere. Ddx: cav mal, infectious, less likely Sturge Nicolas or petechial hemorrhage.\par \par MRA H/N w/wo 7/15/18 ([place], [place]): normal\par \par Cerebral angiogram 7/17/18 ([place], [place]): no aneurysm or AVM \par \par NEUROPHYSIOLOGY:\par cvEEG 7/16 - 7/17/18 ([place], [place]): normal awake and asleep, no IED\par \par NEUROPSYCHOLOGY: \par none

## 2022-12-20 ENCOUNTER — RX RENEWAL (OUTPATIENT)
Age: 42
End: 2022-12-20

## 2023-01-03 ENCOUNTER — RX RENEWAL (OUTPATIENT)
Age: 43
End: 2023-01-03

## 2023-01-09 ENCOUNTER — APPOINTMENT (OUTPATIENT)
Dept: NEUROLOGY | Facility: CLINIC | Age: 43
End: 2023-01-09
Payer: COMMERCIAL

## 2023-01-09 ENCOUNTER — NON-APPOINTMENT (OUTPATIENT)
Age: 43
End: 2023-01-09

## 2023-01-09 VITALS
WEIGHT: 160 LBS | OXYGEN SATURATION: 99 % | DIASTOLIC BLOOD PRESSURE: 68 MMHG | HEART RATE: 62 BPM | HEIGHT: 65 IN | BODY MASS INDEX: 26.66 KG/M2 | SYSTOLIC BLOOD PRESSURE: 102 MMHG

## 2023-01-09 PROCEDURE — 99214 OFFICE O/P EST MOD 30 MIN: CPT

## 2023-01-10 NOTE — HISTORY OF PRESENT ILLNESS
[FreeTextEntry1] : LAST OFFICE VISIT: 9/21/22\par \par PCP: Dr. Benson\par  nickname: Nora \par \par INTERIM HISTORY:\par Pt today accompanied by . Two FA sz's sine last visit in Nov and Dec. Took CZP ODT.  notes that sleep deprivation or poor sleep quality may play a role in breakthrough seizures.\par \par CURRENT ASM:\par CNB 150mg QHS – started 8/2021, rash on 200mg QHS\par LEV ER 1000mg BID – started 2015; level 65.1 on 4/5 on 1500mg BID\par LCM 150mg BID – started 7/2018; level 13.9 on 4/5 on 200mg BID\par CZP ODT 2mg prn – started 8/2018\par \par \par PRIOR HISTORY:\par 8/7/18: This is a 38 year-old RH [place] female with no significant PMH who is self-referred to establish care for epilepsy. Pt today is accompanied by her . Pt was previously followed by neurologist Dr. Chuck Davis. Pt's  provided most of history since pt speaks limited English.\par \par Onset of sz at age 25, during the 3rd month of pregnancy. Pt was in [facility] at the time. She was not started on AED, nor was she started on AED after her second sz at 9th month of pregnancy. After delivery, pt was started on CBZ. She was sz free on CBZ approximately from [id number]. Doctor in [facility] tapered her off of CBZ. When pt unfortunately sustained another sz, she was put back on CBZ. \par \par Pt immig[place] to the [place] around [place]. At the time, she was planning for second pregnancy, so CBZ was switched to LEV 500mg BID. Next seizure occurred on 8/2016, likely triggered by stress. LEV was increased 1gm BID. When pt was sz free for several years on this dosing, LEV was decreased back to 500mg BID. Unfortunately, with lowered dose of LEV, sz recurred on 7/14/18, while pt was in [place]. She was admitted to [facility]. Head imaging found innumerable lesions throughout R supratentorial hemisphere without focal enhancement, edema, or mass effect. Ddx: cav mal, infectious, less likely Sturge Nicolas or petechial hemorrhage. MRA H/N and cerebral angiogram were unremarkable. Pt and  were not aware of this finding. At discharge, LCM 50mg BID was added to LEV 500mg BID.\par \par === 11/8/18 ===\par Pt today is accompanied by her . Clarified that she is really just only LEV 500mg BID (not 1gm BID) and LCM 50mg BID. She was sz free for several yrs on LEV 1gm BID. Sz recurred after LEV was lowered to 500mg BID. Has not had any further sz since last visit. CURRENT AEDs: LEV 500mg BID - started 2015; LCM 50mg BID - started 7/2018; CLZ ODT 0.5mg prn - started 8/2018.\par \par === 2/19/19 ===\par Pt today is accompanied by her . At the last visit, plan was to cross titrate LEV 500mg BID and LCM 50mg BID to LEV 1gm BID. On 11/13, pt was on LEV 1gm BID and LCM 50mg qHS when she had a typical aura. Pt took CLZ ODT. On 12/24, pt was only on LEV 1gm BID and again had another aura, but this time was very mild. She again took CLZ ODT and called the on-call epilepsy fellow, who instructed pt to go back to LEV 500mg BID and LCM 50mg BID. \par \par Pt also saw Dr. 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 Offered cerebral angiography, but pt said she had it done in [place]. She will be bringing the imaging to Dr. Santo once she obtains a copy. Will f/u Dr. Santo and repeat image in a yr. CURRENT AEDs: LEV 500mg BID - started 2015; LCM 50mg BID - started 7/2018; CLZ ODT 0.5mg prn - started 8/2018.\par \par === 6/3/19 ===\par Pt today is accompanied by her . At the last visit, planned to taper off LCM 50mg BID and increase LEV from 500mg BID to 2000mg BID. Three to four days after coming off of LCM, pt had a breakthrough seizure, needed CZP ODT 1mg x2. Pt now on LCM 50mg BID and LEV 1000mg BID. No further sz on this duo-therapy. CURRENT AEDs: LEV 1000mg BID - started 2015, LCM 50mg BID - started 7/2018, CLZ ODT 1mg tabs prn - started 8/2018.\par \par === 9/11/19 ===\par Since last visit, pt has had 4 seizures that started with buzzing noise, visual aura in L eye, nausea and tachycardia. All of which were aborted with CZP ODT. CURRENT AEDs: LEV 1000mg BID - started 2015\par LCM 50mg BID - started 7/2018; CLZ ODT 1mg tabs prn - started 8/2018.\par \par === 11/6/19 ===\par Pt today is accompanied by her . At the last visit, increased LCM from 50mg BID to 100mg BID, which pt did. She was asked to change LEV IR 1gm BID to ER 1gm BID, which pt did not do. Hasn't had any sz since last visit. Saw Dr. Santo; awaiting MRI brain w/wo. CURRENT AEDs: LEV IR 1000mg BID - started 2015; LCM 100mg BID - started 7/2018; CLZ ODT 1mg tabs prn - started 8/2018.\par \par === 1/8/20 ===\par Pt today is accompanied by her . Since the last visit, has had two visual aura (seeing stars) around menstrual cycle, aborted with CZP. Sz threshold lowered by sleep deprivation. Hasn't gotten blood level of AEDs as instructed at the last visit. Still taking LEV IR and not ER because pt does not want to toss the remaining IR tablets; has a month of supply left. Saw Dr. Santo. MRI 11/27/19 stable compared to 12/8/18. No evidence for edema or hemorrhage. Repeat MRI in a yr. CURRENT AEDs: LEV IR 1000mg BID - started 2015; LCM 100mg BID - started 7/2018; CLZ ODT 1mg tabs prn - started 8/2018.\par \par === 6/2/20 ===\par Had a very small aura early 4/2020, aborted with CLZ x2. LCM 150mg BID increased to 200mg BID by Dr. Navarro on 5/5. No further sz. Pt and  expressed that they are no longer actively trying to conceive. CURRENT AEDs: LEV ER 1000mg BID - started 2015; LCM 200mg BID - started 7/2018; CLZ ODT 1mg tabs prn - started 8/2018.\par \par === 8/13/20 ===\par Increased LEV from 1000mg BID to 1500mg BID at the last visit. Had two focal seizures, aborted with CZP. Most breakthrough seizures occur around menses.\par \par === 9/10/20 ===\par No seizure activity noted since before last visit on 8/13/20. She reports that she had to stop taking the clobazam she was prescribed at her last visit due to the excessive drowsiness it caused. Tired on 5mg qHS; intolerable at 10mg qHS. She reports feeling well today and that her sleep has improved. Pending elective EMU next week. CURRENT AEDs: LEV ER 1500mg BID, LCM 200mg BID, CZP ODT 1mg tabs prn - started 8/2018.\par \par === 10/9/20 ===\par Pt today accompanied by . Did not show up for EMU due to family emergency. Tried CLB at 0.25mg, but remains very sleepy. Had one focal aware sz since last visit. CURRENT AEDs: LEV ER 1500mg BID - started 2015; level 51.4 on 8/13/20; LCM 200mg BID - started 7/2018; level 15.6 on 8/13/20; CZP ODT 1mg tabs prn - started 8/2018.\par \par === 1/5/21 ===\par Pt today accompanied by . At the last visit, started on LTG; tolerating well. Had one aura on 11/26, while on a low dose of LTG at 50mg BID, but no further sz since. Reports isolated right sided tinnitus without any other sx’s (not habitual aura). Cancelled elective EMU due to concern for COVID. Saw Dr. Santo in Nov, who ordered repeat MRI w/wo 12/9, demonstrating stable right panhemispheric cav mal without significant change from prior images. CURRENT AEDs: LEV ER 1500mg BID - started 2015; level 51.4 on 8/13/20; LCM 200mg BID - started 7/2018; level 15.6 on 8/13/20; LTG 100mg BID – started 10/2020; CZP ODT 1mg tabs prn - started 8/2018.\par \par === 4/5/21 ===\par Pt today accompanied by . Did not have any sz on LTG 100mg BID, but very sleepy. Therefore, self-decreased LTG to 100mg daily. Had 1 mild focal aware sz on 3/2, but a bigger focal aware sz on 3/25 with visual aura and severe nausea. CURRENT AEDs: LEV ER 1500mg BID - started 2015; level 51.4 on 8/13/20; LCM 200mg BID - started 7/2018; level 15.6 on 8/13/20; LTG 100mg daily – started 10/2020, very sleepy on 100mg BID; CZP ODT 1mg tabs prn - started 8/2018.\par \par === 5/18/21 ===\par Pt today accompanied by . Did not tolerate LTG ER 150mg QHS due to daytime drowsiness. Self-decreased to IR 100mg QHS. No sz since last visit. Had change of mind about surgery; amenable if needed. Got her 2 doses of COVID-19 vaccine. CURRENT AEDs: LEV ER 1500mg BID - started 2015; level 65.1 on 4/5; LCM 200mg BID - started 7/2018; level 13.9 on 4/5; LTG IR 100mg QHS – started 10/2020, lethargic on 150mg QHS, level 2.7 on 4/5; CZP ODT 2mg prn - started 8/2018.\par \par === 8/17/21 ===\par Pt today accompanied by . One focal sz since last visit. Reports hearing voices or feels like someone is in the room after the last sz. Pt has had this many years ago after a cluster of seizures. Symptoms self-resolved after a few months. Pt was amenable to surgery at the last visit, but after explaining to the pt that surgery is unable to guarantee sz freedom, pt no longer interested in surgical options. CURRENT AEDs: LEV ER 1500mg BID - started 2015; level 65.1 on 4/5; LCM 200mg BID - started 7/2018; level 13.9 on 4/5; LTG ER 100mg QHS – started 10/2020, lethargic on 150mg QHS, level 2.7 on 4/5; CZP ODT 2mg prn - started 8/2018.\par \par === 10/18/21 ===\par Pt today accompanied by . Tapered off of LTG. Seizures responding very well to CNB. Called 9/28/21 and reported fatigue on multiple AEDs. Instruction given to slowly lower LEV ER 1500mg BID to 100mg BID and LCM 200mg BID to 150mg BID. One mild focal aware sz as pt decreased the 2 AEDs, but no further sz since. CURRENT ASM: CNB 100mg QHS – started 8/2021; LEV ER 1000mg BID – started 2015; level 65.1 on 4/5 on 1500mg BID; LCM 150mg BID – started 7/2018; level 13.9 on 4/5 on 200mg BID; CZP ODT 2mg prn – started 8/2018.\par \par === 1/24/22 ===\par Pt today accompanied by . Developed blanchable and pruritic rash in trunk and arms a week after increasing CNB from 150mg QHS to 200mg QHS. No facial edema, angioedema, dyspnea, dysphagia. PCP prescribed topical cream and saw dermatologist. CNB temporarily stopped and LEV ER increased from 1000mg BID to 1500mg BID. CNB resumed ~1.5wks later at 100mg QHS. Rash resolved. No sz. CURRENT ASM: CNB 100mg QHS – started 8/2021; LEV ER 1500mg BID – started 2015; level 65.1 on 4/5 on 1500mg BID; LCM 150mg BID – started 7/2018; level 13.9 on 4/5 on 200mg BID; CZP ODT 2mg prn – started 8/2018.\par \par === 6/21/22 ===\par Pt today accompanied by . One FA sz in March and another one in April. CURRENT ASM: CNB 100mg QHS – started 8/2021, rash on 200mg QHS; LEV ER 1000mg BID – started 2015; level 65.1 on 4/5 on 1500mg BID; LCM 150mg BID – started 7/2018; level 13.9 on 4/5 on 200mg BID; CZP ODT 2mg prn – started 8/2018.\par \par === 9/21/22 ===\par Pt today accompanied by . At the last visit, CNB increased from 100mg QHS to 150mg QHS for FA sz. Since the increase, no further seizures. Tolerating ASMs well. CURRENT ASM: CNB 150mg QHS – started 8/2021, rash on 200mg QHS; LEV ER 1000mg BID – started 2015; level 65.1 on 4/5 on 1500mg BID; \par LCM 150mg BID – started 7/2018; level 13.9 on 4/5 on 200mg BID; CZP ODT 2mg prn – started 8/2018.\par \par \par SEIZURE DESCRIPTION AND TYPE:\par Type #1\par Severity: focal sz, both aware and impaired (unclear if progresses to true BL tonic clonic sz)\par Onset: 24yo\par Quality & associated signs/symptoms: Prolonged aura of nausea +/- vomiting, geographic shapes (eg. stars, bubbles) in her L visual field -> loss of awareness, head turning/jerking toward the L, tonic-clonic activity in L arm and leg -> postictal confusion, L Miguel Ángel's. Pt developed postictal psychosis (auditory hallucination, paranoia) once after a sz. 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 Patient was a product of normal pregnancy and delivery. 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 Other punctate cavernous malformations in the septum pellucidum, corpus callosum and infratentorial compartment. 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 No change since 12/7/2018. 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 \par \par MRI w/wo 12/8/18 ([facility]): Multiple foci of T2 signal abnormalities associated with serpiginous vessels as described, which may represent cavernomatosis with associated DVA's. Lesions are unusually hemispheric as no lesions are seen within the left cerebral hemisphere. While findings may represent familial versus sporadic cavernomatosis, radiation induced cavernomatosis in the appropriate clinical setting may be considered. Other differential diagnoses of vascular malformations are not excluded. Findings are not consistent with neurosarcoidosis or lymphoproliferative abnormality. \par \par CTH 7/15/18 ([place], [place]): Gyriform calcifications in the R temporooccipital regions. Multiple subcentimeter hyperdense lesions in the R cerebral hemisphere. No significant edema or midline shift. \par \par MRI w/wo 7/14/18 ([place], [place]): Innumerable lesions throughout R supratentorial hemisphere without evidence of edema or mass effect. Diffuse mild enhancement throughout R supratentorial hemisphere. Ddx: cav mal, infectious, less likely Sturge Nicolas or petechial hemorrhage.\par \par MRA H/N w/wo 7/15/18 ([place], [place]): normal\par \par Cerebral angiogram 7/17/18 ([place], [place]): no aneurysm or AVM \par \par NEUROPHYSIOLOGY:\par cvEEG 7/16 - 7/17/18 ([place], [place]): normal awake and asleep, no IED\par \par NEUROPSYCHOLOGY: \par none

## 2023-01-10 NOTE — ASSESSMENT
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 Santo) and DVAs who presented for followup for focal epilepsy characterized by focal aware and impaired awareness sz's (unclear if progresses to true BTC sz's). Personally reviewed all images, labs, EEG and other studies. \par \par Continues to have refractory FA sz's. H/o rash while increasing CNB 150mg QHS to 200mg QHS; will attempt to increase at a slower rate. Pt not interested in surgical options unless sz freedom guaranteed. All questions and concerns answered and addressed in detail to pt and 's complete satisfaction. Patient and her  verbalized understanding and agreed to plan.\par \par - increase CNB 150mg QHS to 175mg QHS x2-3 weeks -> if no rash, increase to 200mg QHS\par - continue LEV ER 1000mg BID\par - continue LCM 150mg BID\par - cont CLZ ODT 1-2mg prn aura\par - cont folic acid 1mg daily\par - pt does not drive\par - f/u in 3 months\par \par \par All relevant epilepsy AAN quality care measures were addressed and discussed with the patient and her .\par \par More than 50% of time spent counseling and educating patient and her  about epilepsy specific safety issues including AED side effects and interactions, alcohol consumption, sleep deprivation, risks and driving privileges associated with the [facility] Guidelines (pt not driving), how treatment may affect contraction and pregnancy, death related to seizures/SUDEP, seizure 1st aid and risks. Patient and  are educated on seizure precautions, including no driving, no operating heavy machinery, no unsupervised swimming or bathing, no unsupervised use of stove, no climbing heights, and no unsupervised use of sharp objects.

## 2023-01-19 RX ORDER — CENOBAMATE 50 MG/1
50 TABLET, FILM COATED ORAL
Qty: 11 | Refills: 0 | Status: COMPLETED | COMMUNITY
Start: 2023-01-09 | End: 2023-01-19

## 2023-01-19 RX ORDER — LEVETIRACETAM 500 MG/1
500 TABLET, FILM COATED, EXTENDED RELEASE ORAL
Qty: 360 | Refills: 3 | Status: COMPLETED | COMMUNITY
Start: 2019-09-11 | End: 2023-01-19

## 2023-02-13 ENCOUNTER — RX RENEWAL (OUTPATIENT)
Age: 43
End: 2023-02-13

## 2023-02-15 ENCOUNTER — RX RENEWAL (OUTPATIENT)
Age: 43
End: 2023-02-15

## 2023-04-03 ENCOUNTER — APPOINTMENT (OUTPATIENT)
Dept: NEUROLOGY | Facility: CLINIC | Age: 43
End: 2023-04-03
Payer: COMMERCIAL

## 2023-04-03 PROCEDURE — 95816 EEG AWAKE AND DROWSY: CPT

## 2023-04-07 ENCOUNTER — APPOINTMENT (OUTPATIENT)
Dept: NEUROLOGY | Facility: CLINIC | Age: 43
End: 2023-04-07
Payer: COMMERCIAL

## 2023-04-07 VITALS
SYSTOLIC BLOOD PRESSURE: 119 MMHG | HEART RATE: 79 BPM | BODY MASS INDEX: 27.49 KG/M2 | HEIGHT: 65 IN | WEIGHT: 165 LBS | DIASTOLIC BLOOD PRESSURE: 79 MMHG

## 2023-04-07 PROCEDURE — 99215 OFFICE O/P EST HI 40 MIN: CPT

## 2023-04-07 RX ORDER — CLONAZEPAM 1 MG/1
1 TABLET ORAL
Qty: 10 | Refills: 0 | Status: DISCONTINUED | COMMUNITY
Start: 2022-07-29 | End: 2023-04-07

## 2023-04-07 RX ORDER — CLONAZEPAM 1 MG/1
1 TABLET, ORALLY DISINTEGRATING ORAL
Qty: 30 | Refills: 5 | Status: DISCONTINUED | COMMUNITY
Start: 2018-08-13 | End: 2023-04-07

## 2023-04-23 ENCOUNTER — RX RENEWAL (OUTPATIENT)
Age: 43
End: 2023-04-23

## 2023-04-23 NOTE — PHYSICAL EXAM
[FreeTextEntry1] : \par GENERAL PHYSICAL EXAM:\par GEN: no distress\par HEENT: NCAT, OP clear\par EYES: sclera white, conjunctiva clear, no nystagmus\par NECK: supple\par CV: RRR 		\par PULM: CTAB, no wheezing\par GI: soft ABD, +BS, NT, ND\par EXT: peripheral pulse intact, no cyanosis\par MSK: muscle tone and strength normal\par SKIN: warm, dry, no rash or lesion on exposed skin \par \par NEUROLOGICAL EXAM:\par Mental Status\par Orientation: alert and oriented to person, place, time, and situation \par Language: clear and fluent\par \par Cranial Nerves\par II: full visual fields intact \par III, IV, VI: PERRL, EOMI\par V, VII: facial sensation and movement intact and symmetric \par VIII: hearing intact \par IX, X: uvula midline, soft palate elevates normally \par XI: BL shoulder shrug intact \par XII: tongue midline\par \par Motor\par Shoulder abd: 5 (R), 5 (L)\par EF/EE: 5 (R), 5 (L)\par WF/WE: 5 (R), 5 (L)\par hand : 5 (R), 5 (L)\par HF/HE: 5 (R), 5 (L)\par KF/KE: 5 (R), 5 (L)\par DF/PF: 5 (R), 5 (L) \par Tone and bulk are normal in upper and lower limbs\par No pronator drift\par \par Sensation\par Intact to light touch and pinprick in all 4 EXTs\par \par Reflex\par 2+ in BL biceps, triceps, brachioradialis, patella, ankle \par \par Coordination\par Normal FTN bilaterally\par \par Gait\par Normal stance, stride, and pivot turn

## 2023-04-23 NOTE — HISTORY OF PRESENT ILLNESS
[FreeTextEntry1] : PCP: Dr. Benson\par  nickname: Nora \par \par *** 04/07/2023  ***\par Pt today accompanied by . At last visit CNB was increased to 175 however developed rash (on wrists - not clearly related to cenobamate) and dose was decreased back to 150 mg and LEV was switched  mg BID. Continuous to experience infrequent (x1/ 3 months) "small seizures" characterized by evolving scotomas over the left visual field followed by headache / tachycardia and left hemibody paresthesias. They are overall satisfied with the current treatment regimen. \par CURRENT ASM:\par CNB 150mg QHS – started 8/2021, rash on 200mg QHS\par LEV ER 1000mg BID – started 2015; level 65.1 on 4/5 on 1500mg BID\par LCM 150mg BID – started 7/2018; level 13.9 on 4/5 on 200mg BID\par CZP ODT 2mg prn – started 8/2018\par \par === 9/21/22 ===\par Pt today accompanied by . At the last visit, CNB increased from 100mg QHS to 150mg QHS for FA sz. Since the increase, no further seizures. Tolerating ASMs well. CURRENT ASM: CNB 150mg QHS – started 8/2021, rash on 200mg QHS; LEV ER 1000mg BID – started 2015; level 65.1 on 4/5 on 1500mg BID; \par LCM 150mg BID – started 7/2018; level 13.9 on 4/5 on 200mg BID; CZP ODT 2mg prn – started 8/2018.\par \par === 6/21/22 ===\par Pt today accompanied by . One FA sz in March and another one in April. CURRENT ASM: CNB 100mg QHS – started 8/2021, rash on 200mg QHS; LEV ER 1000mg BID – started 2015; level 65.1 on 4/5 on 1500mg BID; LCM 150mg BID – started 7/2018; level 13.9 on 4/5 on 200mg BID; CZP ODT 2mg prn – started 8/2018.\par \par === 1/24/22 ===\par Pt today accompanied by . Developed blanchable and pruritic rash in trunk and arms a week after increasing CNB from 150mg QHS to 200mg QHS. No facial edema, angioedema, dyspnea, dysphagia. PCP prescribed topical cream and saw dermatologist. CNB temporarily stopped and LEV ER increased from 1000mg BID to 1500mg BID. CNB resumed ~1.5wks later at 100mg QHS. Rash resolved. No sz. CURRENT ASM: CNB 100mg QHS – started 8/2021; LEV ER 1500mg BID – started 2015; level 65.1 on 4/5 on 1500mg BID; LCM 150mg BID – started 7/2018; level 13.9 on 4/5 on 200mg BID; CZP ODT 2mg prn – started 8/2018.\par \par === 10/18/21 ===\par Pt today accompanied by . Tapered off of LTG. Seizures responding very well to CNB. Called 9/28/21 and reported fatigue on multiple AEDs. Instruction given to slowly lower LEV ER 1500mg BID to 100mg BID and LCM 200mg BID to 150mg BID. One mild focal aware sz as pt decreased the 2 AEDs, but no further sz since. CURRENT ASM: CNB 100mg QHS – started 8/2021; LEV ER 1000mg BID – started 2015; level 65.1 on 4/5 on 1500mg BID; LCM 150mg BID – started 7/2018; level 13.9 on 4/5 on 200mg BID; CZP ODT 2mg prn – started 8/2018.\par \par === 8/17/21 ===\par Pt today accompanied by . One focal sz since last visit. Reports hearing voices or feels like someone is in the room after the last sz. Pt has had this many years ago after a cluster of seizures. Symptoms self-resolved after a few months. Pt was amenable to surgery at the last visit, but after explaining to the pt that surgery is unable to guarantee sz freedom, pt no longer interested in surgical options. CURRENT AEDs: LEV ER 1500mg BID - started 2015; level 65.1 on 4/5; LCM 200mg BID - started 7/2018; level 13.9 on 4/5; LTG ER 100mg QHS – started 10/2020, lethargic on 150mg QHS, level 2.7 on 4/5; CZP ODT 2mg prn - started 8/2018.\par \par === 5/18/21 ===\par Pt today accompanied by . Did not tolerate LTG ER 150mg QHS due to daytime drowsiness. Self-decreased to IR 100mg QHS. No sz since last visit. Had change of mind about surgery; amenable if needed. Got her 2 doses of COVID-19 vaccine. CURRENT AEDs: LEV ER 1500mg BID - started 2015; level 65.1 on 4/5; LCM 200mg BID - started 7/2018; level 13.9 on 4/5; LTG IR 100mg QHS – started 10/2020, lethargic on 150mg QHS, level 2.7 on 4/5; CZP ODT 2mg prn - started 8/2018.\par \par === 4/5/21 ===\par Pt today accompanied by . Did not have any sz on LTG 100mg BID, but very sleepy. Therefore, self-decreased LTG to 100mg daily. Had 1 mild focal aware sz on 3/2, but a bigger focal aware sz on 3/25 with visual aura and severe nausea. CURRENT AEDs: LEV ER 1500mg BID - started 2015; level 51.4 on 8/13/20; LCM 200mg BID - started 7/2018; level 15.6 on 8/13/20; LTG 100mg daily – started 10/2020, very sleepy on 100mg BID; CZP ODT 1mg tabs prn - started 8/2018.\par \par === 1/5/21 ===\par Pt today accompanied by . At the last visit, started on LTG; tolerating well. Had one aura on 11/26, while on a low dose of LTG at 50mg BID, but no further sz since. Reports isolated right sided tinnitus without any other sx’s (not habitual aura). Cancelled elective EMU due to concern for COVID. Saw Dr. Santo in Nov, who ordered repeat MRI w/wo 12/9, demonstrating stable right panhemispheric cav mal without significant change from prior images. CURRENT AEDs: LEV ER 1500mg BID - started 2015; level 51.4 on 8/13/20; LCM 200mg BID - started 7/2018; level 15.6 on 8/13/20; LTG 100mg BID – started 10/2020; CZP ODT 1mg tabs prn - started 8/2018.\par \par === 10/9/20 ===\par Pt today accompanied by . Did not show up for EMU due to family emergency. Tried CLB at 0.25mg, but remains very sleepy. Had one focal aware sz since last visit. CURRENT AEDs: LEV ER 1500mg BID - started 2015; level 51.4 on 8/13/20; LCM 200mg BID - started 7/2018; level 15.6 on 8/13/20; CZP ODT 1mg tabs prn - started 8/2018.\par \par === 9/10/20 ===\par No seizure activity noted since before last visit on 8/13/20. She reports that she had to stop taking the clobazam she was prescribed at her last visit due to the excessive drowsiness it caused. Tired on 5mg qHS; intolerable at 10mg qHS. She reports feeling well today and that her sleep has improved. Pending elective EMU next week. CURRENT AEDs: LEV ER 1500mg BID, LCM 200mg BID, CZP ODT 1mg tabs prn - started 8/2018.\par \par === 8/13/20 ===\par Increased LEV from 1000mg BID to 1500mg BID at the last visit. Had two focal seizures, aborted with CZP. Most breakthrough seizures occur around menses.\par \par === 6/2/20 ===\par Had a very small aura early 4/2020, aborted with CLZ x2. LCM 150mg BID increased to 200mg BID by Dr. Navarro on 5/5. No further sz. Pt and  expressed that they are no longer actively trying to conceive. CURRENT AEDs: LEV ER 1000mg BID - started 2015; LCM 200mg BID - started 7/2018; CLZ ODT 1mg tabs prn - started 8/2018.\par \par === 1/8/20 ===\par Pt today is accompanied by her . Since the last visit, has had two visual aura (seeing stars) around menstrual cycle, aborted with CZP. Sz threshold lowered by sleep deprivation. Hasn't gotten blood level of AEDs as instructed at the last visit. Still taking LEV IR and not ER because pt does not want to toss the remaining IR tablets; has a month of supply left. Saw Dr. Santo. MRI 11/27/19 stable compared to 12/8/18. No evidence for edema or hemorrhage. Repeat MRI in a yr. CURRENT AEDs: LEV IR 1000mg BID - started 2015; LCM 100mg BID - started 7/2018; CLZ ODT 1mg tabs prn - started 8/2018.\par \par === 11/6/19 ===\par Pt today is accompanied by her . At the last visit, increased LCM from 50mg BID to 100mg BID, which pt did. She was asked to change LEV IR 1gm BID to ER 1gm BID, which pt did not do. Hasn't had any sz since last visit. Saw Dr. Santo; awaiting MRI brain w/wo. CURRENT AEDs: LEV IR 1000mg BID - started 2015; LCM 100mg BID - started 7/2018; CLZ ODT 1mg tabs prn - started 8/2018.\par \par === 9/11/19 ===\par Since last visit, pt has had 4 seizures that started with buzzing noise, visual aura in L eye, nausea and tachycardia. All of which were aborted with CZP ODT. CURRENT AEDs: LEV 1000mg BID - started 2015\par LCM 50mg BID - started 7/2018; CLZ ODT 1mg tabs prn - started 8/2018.\par \par === 6/3/19 ===\par Pt today is accompanied by her . At the last visit, planned to taper off LCM 50mg BID and increase LEV from 500mg BID to 2000mg BID. Three to four days after coming off of LCM, pt had a breakthrough seizure, needed CZP ODT 1mg x2. Pt now on LCM 50mg BID and LEV 1000mg BID. No further sz on this duo-therapy. CURRENT AEDs: LEV 1000mg BID - started 2015, LCM 50mg BID - started 7/2018, CLZ ODT 1mg tabs prn - started 8/2018.\par \par === 2/19/19 ===\par Pt today is accompanied by her . At the last visit, plan was to cross titrate LEV 500mg BID and LCM 50mg BID to LEV 1gm BID. On 11/13, pt was on LEV 1gm BID and LCM 50mg qHS when she had a typical aura. Pt took CLZ ODT. On 12/24, pt was only on LEV 1gm BID and again had another aura, but this time was very mild. She again took CLZ ODT and called the on-call epilepsy fellow, who instructed pt to go back to LEV 500mg BID and LCM 50mg BID. \par \par Pt also saw Dr. 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 Offered cerebral angiography, but pt said she had it done in [place]. She will be bringing the imaging to Dr. Santo once she obtains a copy. Will f/u Dr. Santo and repeat image in a yr. CURRENT AEDs: LEV 500mg BID - started 2015; LCM 50mg BID - started 7/2018; CLZ ODT 0.5mg prn - started 8/2018.\par \par === 11/8/18 ===\par Pt today is accompanied by her . Clarified that she is really just only LEV 500mg BID (not 1gm BID) and LCM 50mg BID. She was sz free for several yrs on LEV 1gm BID. Sz recurred after LEV was lowered to 500mg BID. Has not had any further sz since last visit. CURRENT AEDs: LEV 500mg BID - started 2015; LCM 50mg BID - started 7/2018; CLZ ODT 0.5mg prn - started 8/2018.\par \par **** 8/7/18 ***\par This is a 38 year-old RH Indian female with no significant PMH who is self-referred to establish care for epilepsy. Pt today is accompanied by her . Pt was previously followed by neurologist Dr. Chuck Davis. Pt's  provided most of history since pt speaks limited English.\par \par Onset of sz at age 25, during the 3rd month of pregnancy. Pt was in [facility] at the time. She was not started on AED, nor was she started on AED after her second sz at 9th month of pregnancy. After delivery, pt was started on CBZ. She was sz free on CBZ approximately from [id number]. Doctor in [facility] tapered her off of CBZ. When pt unfortunately sustained another sz, she was put back on CBZ. \par \par Pt immig[place] to the [place] around [place]. At the time, she was planning for second pregnancy, so CBZ was switched to LEV 500mg BID. Next seizure occurred on 8/2016, likely triggered by stress. LEV was increased 1gm BID. When pt was sz free for several years on this dosing, LEV was decreased back to 500mg BID. Unfortunately, with lowered dose of LEV, sz recurred on 7/14/18, while pt was in [place]. She was admitted to [facility]. Head imaging found innumerable lesions throughout R supratentorial hemisphere without focal enhancement, edema, or mass effect. Ddx: cav mal, infectious, less likely Sturge Nicolas or petechial hemorrhage. MRA H/N and cerebral angiogram were unremarkable. Pt and  were not aware of this finding. At discharge, LCM 50mg BID was added to LEV 500mg BID.\par \par SEIZURE DESCRIPTION AND TYPE:\par Type #1\par Severity: focal sz, both aware and impaired (unclear if progresses to true BL tonic clonic sz)\par Onset: 24yo\par Quality & associated signs/symptoms: Prolonged aura of nausea +/- vomiting, geographic shapes (eg. stars, bubbles) in her L visual field -> loss of awareness, head turning/jerking toward the L, tonic-clonic activity in L arm and leg -> postictal confusion, L Miguel Ángel's. Pt developed postictal psychosis (auditory hallucination, paranoia) once after a sz. 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 Patient was a product of normal pregnancy and delivery. 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 Other punctate cavernous malformations in the septum pellucidum, corpus callosum and infratentorial compartment. 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 No change since 12/7/2018. 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 \par \par MRI w/wo 12/8/18 ([facility]): Multiple foci of T2 signal abnormalities associated with serpiginous vessels as described, which may represent cavernomatosis with associated DVA's. Lesions are unusually hemispheric as no lesions are seen within the left cerebral hemisphere. While findings may represent familial versus sporadic cavernomatosis, radiation induced cavernomatosis in the appropriate clinical setting may be considered. Other differential diagnoses of vascular malformations are not excluded. Findings are not consistent with neurosarcoidosis or lymphoproliferative abnormality. \par \par CTH 7/15/18 ([place], [place]): Gyriform calcifications in the R temporooccipital regions. Multiple subcentimeter hyperdense lesions in the R cerebral hemisphere. No significant edema or midline shift. \par \par MRI w/wo 7/14/18 ([place], [place]): Innumerable lesions throughout R supratentorial hemisphere without evidence of edema or mass effect. Diffuse mild enhancement throughout R supratentorial hemisphere. Ddx: cav mal, infectious, less likely Sturge Nicolas or petechial hemorrhage.\par \par MRA H/N w/wo 7/15/18 ([facility], [place]): normal\par \par Cerebral angiogram 7/17/18 ([place], [place]): no aneurysm or AVM \par \par NEUROPHYSIOLOGY:\par cvEEG 7/16 - 7/17/18 ([facility], [place]): normal awake and asleep, no IED\par \par NEUROPSYCHOLOGY: \par none

## 2023-04-23 NOTE — ASSESSMENT
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 Santo) and DVAs who presented for followup for focal epilepsy characterized by focal aware and impaired awareness sz's (unclear if progresses to true BTC sz's). Personally reviewed all images, labs, EEG and other studies. \par \par Continues to have refractory FA sz's. developed rash on higher doses of CNB. Pt not interested in surgical options unless sz freedom guaranteed. All questions and concerns answered and addressed in detail to pt and 's complete satisfaction. Patient and her  verbalized understanding and agreed to plan.\par \par - Continue CNB 150mg QHS\par - continue LEV ER 1000mg BID\par - continue LCM 150mg BID\par - cont CLZ ODT 1-2mg prn aura\par - cont folic acid 1mg daily\par - pt does not drive\par - f/u in 3 months\par \par More than 50% of time spent counseling and educating patient and her  about epilepsy specific safety issues including AED side effects and interactions, alcohol consumption, sleep deprivation, risks and driving privileges associated with the [facility] Guidelines (pt not driving), how treatment may affect contraction and pregnancy, death related to seizures/SUDEP, seizure 1st aid and risks. Patient and  are educated on seizure precautions, including no driving, no operating heavy machinery, no unsupervised swimming or bathing, no unsupervised use of stove, no climbing heights, and no unsupervised use of sharp objects. \par

## 2023-04-23 NOTE — END OF VISIT
[FreeTextEntry3] : Ms. JERRY BUTCHER was seen and examined with Epilepsy fellow, Dr. Luna Carvalho.  I reviewed history and plan with Ms. BUTCHER and edited the note.

## 2023-08-07 ENCOUNTER — APPOINTMENT (OUTPATIENT)
Dept: NEUROLOGY | Facility: CLINIC | Age: 43
End: 2023-08-07

## 2023-10-23 ENCOUNTER — APPOINTMENT (OUTPATIENT)
Dept: OPHTHALMOLOGY | Facility: CLINIC | Age: 43
End: 2023-10-23

## 2023-12-22 RX ORDER — LEVETIRACETAM 500 MG/1
500 TABLET, FILM COATED, EXTENDED RELEASE ORAL
Qty: 60 | Refills: 5 | Status: ACTIVE | COMMUNITY
Start: 2023-07-07 | End: 1900-01-01

## 2024-04-09 ENCOUNTER — APPOINTMENT (OUTPATIENT)
Dept: NEUROLOGY | Facility: CLINIC | Age: 44
End: 2024-04-09
Payer: COMMERCIAL

## 2024-04-09 VITALS
BODY MASS INDEX: 27.49 KG/M2 | DIASTOLIC BLOOD PRESSURE: 83 MMHG | HEART RATE: 72 BPM | SYSTOLIC BLOOD PRESSURE: 125 MMHG | HEIGHT: 65 IN | WEIGHT: 165 LBS

## 2024-04-09 DIAGNOSIS — Q28.3 OTHER MALFORMATIONS OF CEREBRAL VESSELS: ICD-10-CM

## 2024-04-09 DIAGNOSIS — G40.219 LOCALIZATION-RELATED (FOCAL) (PARTIAL) SYMPTOMATIC EPILEPSY AND EPILEPTIC SYNDROMES WITH COMPLEX PARTIAL SEIZURES, INTRACTABLE, W/OUT STATUS EPILEPTICUS: ICD-10-CM

## 2024-04-09 DIAGNOSIS — G40.804 OTHER EPILEPSY, INTRACTABLE, W/OUT STATUS EPILEPTICUS: ICD-10-CM

## 2024-04-09 PROCEDURE — 99214 OFFICE O/P EST MOD 30 MIN: CPT

## 2024-04-09 RX ORDER — LACOSAMIDE 100 MG/1
100 TABLET ORAL
Qty: 180 | Refills: 1 | Status: ACTIVE | COMMUNITY
Start: 2022-04-28 | End: 1900-01-01

## 2024-04-09 RX ORDER — CENOBAMATE 200 MG/1
200 TABLET, FILM COATED ORAL
Qty: 90 | Refills: 1 | Status: ACTIVE | COMMUNITY
Start: 2021-11-15 | End: 1900-01-01

## 2024-04-09 RX ORDER — FOLIC ACID 1 MG/1
1 TABLET ORAL DAILY
Qty: 90 | Refills: 3 | Status: ACTIVE | COMMUNITY
Start: 2018-08-07 | End: 1900-01-01

## 2024-04-09 RX ORDER — CLONAZEPAM 1 MG/1
1 TABLET, ORALLY DISINTEGRATING ORAL
Qty: 15 | Refills: 0 | Status: ACTIVE | COMMUNITY
Start: 2024-04-09 | End: 1900-01-01

## 2024-04-09 RX ORDER — CLONAZEPAM 1 MG/1
1 TABLET ORAL DAILY
Qty: 30 | Refills: 0 | Status: DISCONTINUED | COMMUNITY
Start: 2023-04-07 | End: 2024-04-09

## 2024-04-09 NOTE — HISTORY OF PRESENT ILLNESS
[FreeTextEntry1] : PCP: Dr. Benson  nickname: [email or web address]   *** 04/09/2024  *** Ms. BUTCHER returns for follow up.  She reports that she continues having frequent seizure aura (vs complex partial seizure) postictally she c/o body pain and HA. She has not had a convulsion or motor seizure in more than a year.  She describes seizure as sometimes beginning with flashing light or pressure in ears. She and her  endorse that she is often tired and sleepy.  ASM  cenobamate 150 qD lacosamide 150 q12 levetiracetam ER 1000 q12 brivaracetam 75 q12  *** 04/07/2023  *** Pt today accompanied by . At last visit CNB was increased to 175 however developed rash (on wrists - not clearly related to cenobamate) and dose was decreased back to 150 mg and LEV was switched  mg BID. Continuous to experience infrequent (x1/ 3 months) "small seizures" characterized by evolving scotomas over the left visual field followed by headache / tachycardia and left hemibody paresthesias. They are overall satisfied with the current treatment regimen.  CURRENT ASM: CNB 150mg QHS - started 8/2021, rash on 200mg QHS LEV ER 1000mg BID - started 2015; level 65.1 on 4/5 on 1500mg BID LCM 150mg BID - started 7/2018; level 13.9 on 4/5 on 200mg BID CZP ODT 2mg prn - started 8/2018  === 9/21/22 === Pt today accompanied by . At the last visit, CNB increased from 100mg QHS to 150mg QHS for FA sz. Since the increase, no further seizures. Tolerating ASMs well. CURRENT ASM: CNB 150mg QHS - started 8/2021, rash on 200mg QHS; LEV ER 1000mg BID - started 2015; level 65.1 on 4/5 on 1500mg BID;  LCM 150mg BID - started 7/2018; level 13.9 on 4/5 on 200mg BID; CZP ODT 2mg prn - started 8/2018.  === 6/21/22 === Pt today accompanied by . One FA sz in March and another one in April. CURRENT ASM: CNB 100mg QHS - started 8/2021, rash on 200mg QHS; LEV ER 1000mg BID - started 2015; level 65.1 on 4/5 on 1500mg BID; LCM 150mg BID - started 7/2018; level 13.9 on 4/5 on 200mg BID; CZP ODT 2mg prn - started 8/2018.  === 1/24/22 === Pt today accompanied by . Developed blanchable and pruritic rash in trunk and arms a week after increasing CNB from 150mg QHS to 200mg QHS. No facial edema, angioedema, dyspnea, dysphagia. PCP prescribed topical cream and saw dermatologist. CNB temporarily stopped and LEV ER increased from 1000mg BID to 1500mg BID. CNB resumed ~1.5wks later at 100mg QHS. Rash resolved. No sz. CURRENT ASM: CNB 100mg QHS - started 8/2021; LEV ER 1500mg BID - started 2015; level 65.1 on 4/5 on 1500mg BID; LCM 150mg BID - started 7/2018; level 13.9 on 4/5 on 200mg BID; CZP ODT 2mg prn - started 8/2018.  === 10/18/21 === Pt today accompanied by . Tapered off of LTG. Seizures responding very well to CNB. Called 9/28/21 and reported fatigue on multiple AEDs. Instruction given to slowly lower LEV ER 1500mg BID to 100mg BID and LCM 200mg BID to 150mg BID. One mild focal aware sz as pt decreased the 2 AEDs, but no further sz since. CURRENT ASM: CNB 100mg QHS - started 8/2021; LEV ER 1000mg BID - started 2015; level 65.1 on 4/5 on 1500mg BID; LCM 150mg BID - started 7/2018; level 13.9 on 4/5 on 200mg BID; CZP ODT 2mg prn - started 8/2018.  === 8/17/21 === Pt today accompanied by . One focal sz since last visit. Reports hearing voices or feels like someone is in the room after the last sz. Pt has had this many years ago after a cluster of seizures. Symptoms self-resolved after a few months. Pt was amenable to surgery at the last visit, but after explaining to the pt that surgery is unable to guarantee sz freedom, pt no longer interested in surgical options. CURRENT AEDs: LEV ER 1500mg BID - started 2015; level 65.1 on 4/5; LCM 200mg BID - started 7/2018; level 13.9 on 4/5; LTG ER 100mg QHS - started 10/2020, lethargic on 150mg QHS, level 2.7 on 4/5; CZP ODT 2mg prn - started 8/2018.  === 5/18/21 === Pt today accompanied by . Did not tolerate LTG ER 150mg QHS due to daytime drowsiness. Self-decreased to IR 100mg QHS. No sz since last visit. Had change of mind about surgery; amenable if needed. Got her 2 doses of COVID-19 vaccine. CURRENT AEDs: LEV ER 1500mg BID - started 2015; level 65.1 on 4/5; LCM 200mg BID - started 7/2018; level 13.9 on 4/5; LTG IR 100mg QHS - started 10/2020, lethargic on 150mg QHS, level 2.7 on 4/5; CZP ODT 2mg prn - started 8/2018.  === 4/5/21 === Pt today accompanied by . Did not have any sz on LTG 100mg BID, but very sleepy. Therefore, self-decreased LTG to 100mg daily. Had 1 mild focal aware sz on 3/2, but a bigger focal aware sz on 3/25 with visual aura and severe nausea. CURRENT AEDs: LEV ER 1500mg BID - started 2015; level 51.4 on 8/13/20; LCM 200mg BID - started 7/2018; level 15.6 on 8/13/20; LTG 100mg daily - started 10/2020, very sleepy on 100mg BID; CZP ODT 1mg tabs prn - started 8/2018.  === 1/5/21 === Pt today accompanied by . At the last visit, started on LTG; tolerating well. Had one aura on 11/26, while on a low dose of LTG at 50mg BID, but no further sz since. Reports isolated right sided tinnitus without any other sx's (not habitual aura). Cancelled elective EMU due to concern for COVID. Saw Dr. Santo in Nov, who ordered repeat MRI w/wo 12/9, demonstrating stable right panhemispheric cav mal without significant change from prior images. CURRENT AEDs: LEV ER 1500mg BID - started 2015; level 51.4 on 8/13/20; LCM 200mg BID - started 7/2018; level 15.6 on 8/13/20; LTG 100mg BID - started 10/2020; CZP ODT 1mg tabs prn - started 8/2018.  === 10/9/20 === Pt today accompanied by . Did not show up for EMU due to family emergency. Tried CLB at 0.25mg, but remains very sleepy. Had one focal aware sz since last visit. CURRENT AEDs: LEV ER 1500mg BID - started 2015; level 51.4 on 8/13/20; LCM 200mg BID - started 7/2018; level 15.6 on 8/13/20; CZP ODT 1mg tabs prn - started 8/2018.  === 9/10/20 === No seizure activity noted since before last visit on 8/13/20. She reports that she had to stop taking the clobazam she was prescribed at her last visit due to the excessive drowsiness it caused. Tired on 5mg qHS; intolerable at 10mg qHS. She reports feeling well today and that her sleep has improved. Pending elective EMU next week. CURRENT AEDs: LEV ER 1500mg BID, LCM 200mg BID, CZP ODT 1mg tabs prn - started 8/2018.  === 8/13/20 === Increased LEV from 1000mg BID to 1500mg BID at the last visit. Had two focal seizures, aborted with CZP. Most breakthrough seizures occur around menses.  === 6/2/20 === Had a very small aura early 4/2020, aborted with CLZ x2. LCM 150mg BID increased to 200mg BID by Dr. Navarro on 5/5. No further sz. Pt and  expressed that they are no longer actively trying to conceive. CURRENT AEDs: LEV ER 1000mg BID - started 2015; LCM 200mg BID - started 7/2018; CLZ ODT 1mg tabs prn - started 8/2018.  === 1/8/20 === Pt today is accompanied by her . Since the last visit, has had two visual aura (seeing stars) around menstrual cycle, aborted with CZP. Sz threshold lowered by sleep deprivation. Hasn't gotten blood level of AEDs as instructed at the last visit. Still taking LEV IR and not ER because pt does not want to toss the remaining IR tablets; has a month of supply left. Saw Dr. Santo. MRI 11/27/19 stable compared to 12/8/18. No evidence for edema or hemorrhage. Repeat MRI in a yr. CURRENT AEDs: LEV IR 1000mg BID - started 2015; LCM 100mg BID - started 7/2018; CLZ ODT 1mg tabs prn - started 8/2018.  === 11/6/19 === Pt today is accompanied by her . At the last visit, increased LCM from 50mg BID to 100mg BID, which pt did. She was asked to change LEV IR 1gm BID to ER 1gm BID, which pt did not do. Hasn't had any sz since last visit. Saw Dr. Santo; awaiting MRI brain w/wo. CURRENT AEDs: LEV IR 1000mg BID - started 2015; LCM 100mg BID - started 7/2018; CLZ ODT 1mg tabs prn - started 8/2018.  === 9/11/19 === Since last visit, pt has had 4 seizures that started with buzzing noise, visual aura in L eye, nausea and tachycardia. All of which were aborted with CZP ODT. CURRENT AEDs: LEV 1000mg BID - started 2015 LCM 50mg BID - started 7/2018; CLZ ODT 1mg tabs prn - started 8/2018.  === 6/3/19 === Pt today is accompanied by her . At the last visit, planned to taper off LCM 50mg BID and increase LEV from 500mg BID to 2000mg BID. Three to four days after coming off of LCM, pt had a breakthrough seizure, needed CZP ODT 1mg x2. Pt now on LCM 50mg BID and LEV 1000mg BID. No further sz on this duo-therapy. CURRENT AEDs: LEV 1000mg BID - started 2015, LCM 50mg BID - started 7/2018, CLZ ODT 1mg tabs prn - started 8/2018.  === 2/19/19 === Pt today is accompanied by her . At the last visit, plan was to cross titrate LEV 500mg BID and LCM 50mg BID to LEV 1gm BID. On 11/13, pt was on LEV 1gm BID and LCM 50mg qHS when she had a typical aura. Pt took CLZ ODT. On 12/24, pt was only on LEV 1gm BID and again had another aura, but this time was very mild. She again took CLZ ODT and called the on-call epilepsy fellow, who instructed pt to go back to LEV 500mg BID and LCM 50mg BID.   Pt also saw Dr. 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 Offered cerebral angiography, but pt said she had it done in [place]. She will be bringing the imaging to Dr. Santo once she obtains a copy. Will f/u Dr. Santo and repeat image in a yr. CURRENT AEDs: LEV 500mg BID - started 2015; LCM 50mg BID - started 7/2018; CLZ ODT 0.5mg prn - started 8/2018.  === 11/8/18 === Pt today is accompanied by her . Clarified that she is really just only LEV 500mg BID (not 1gm BID) and LCM 50mg BID. She was sz free for several yrs on LEV 1gm BID. Sz recurred after LEV was lowered to 500mg BID. Has not had any further sz since last visit. CURRENT AEDs: LEV 500mg BID - started 2015; LCM 50mg BID - started 7/2018; CLZ ODT 0.5mg prn - started 8/2018.  **** 8/7/18 *** This is a 38 year-old RH [place] female with no significant PMH who is self-referred to establish care for epilepsy. Pt today is accompanied by her . Pt was previously followed by neurologist Dr. Chuck Davis. Pt's  provided most of history since pt speaks limited English.  Onset of sz at age 25, during the 3rd month of pregnancy. Pt was in [facility] at the time. She was not started on AED, nor was she started on AED after her second sz at 9th month of pregnancy. After delivery, pt was started on CBZ. She was sz free on CBZ approximately from [id number]. Doctor in [facility] tapered her off of CBZ. When pt unfortunately sustained another sz, she was put back on CBZ.   Pt immig[place] to the [place] around [place]. At the time, she was planning for second pregnancy, so CBZ was switched to LEV 500mg BID. Next seizure occurred on 8/2016, likely triggered by stress. LEV was increased 1gm BID. When pt was sz free for several years on this dosing, LEV was decreased back to 500mg BID. Unfortunately, with lowered dose of LEV, sz recurred on 7/14/18, while pt was in [place]. She was admitted to [facility]. Head imaging found innumerable lesions throughout R supratentorial hemisphere without focal enhancement, edema, or mass effect. Ddx: cav mal, infectious, less likely Sturge Nicolas or petechial hemorrhage. MRA H/N and cerebral angiogram were unremarkable. Pt and  were not aware of this finding. At discharge, LCM 50mg BID was added to LEV 500mg BID.  SEIZURE DESCRIPTION AND TYPE: Type #1 Severity: focal sz, both aware and impaired (unclear if progresses to true BL tonic clonic sz) Onset: 24yo Quality & associated signs/symptoms: Prolonged aura of nausea +/- vomiting, geographic shapes (eg. stars, bubbles) in her L visual field -> loss of awareness, head turning/jerking toward the L, tonic-clonic activity in L arm and leg -> postictal confusion, L Miguel Ángel's. Pt developed postictal psychosis (auditory hallucination, paranoia) once after a sz.  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 Patient was a product of normal pregnancy and delivery. No history of febrile seizure, TBI, CNS infection, or FH of seizures.  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 Other punctate cavernous malformations in the septum pellucidum, corpus callosum and infratentorial compartment. 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  MRI w/wo 11/27/19 ([facility]): Right cerebral hemisphere slightly smaller than the left. No change since 12/7/2018. 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   MRI w/wo 12/8/18 ([facility]): Multiple foci of T2 signal abnormalities associated with serpiginous vessels as described, which may represent cavernomatosis with associated DVA's. Lesions are unusually hemispheric as no lesions are seen within the left cerebral hemisphere. While findings may represent familial versus sporadic cavernomatosis, radiation induced cavernomatosis in the appropriate clinical setting may be considered. Other differential diagnoses of vascular malformations are not excluded. Findings are not consistent with neurosarcoidosis or lymphoproliferative abnormality.   CTH 7/15/18 ([place], [place]): Gyriform calcifications in the R temporooccipital regions. Multiple subcentimeter hyperdense lesions in the R cerebral hemisphere. No significant edema or midline shift.   MRI w/wo 7/14/18 ([place], [place]): Innumerable lesions throughout R supratentorial hemisphere without evidence of edema or mass effect. Diffuse mild enhancement throughout R supratentorial hemisphere. Ddx: cav mal, infectious, less likely Sturge Nicolas or petechial hemorrhage.  MRA H/N w/wo 7/15/18 ([place], [place]): normal  Cerebral angiogram 7/17/18 ([place], [place]): no aneurysm or AVM   NEUROPHYSIOLOGY: cvEEG 7/16 - 7/17/18 ([facility], [place]): normal awake and asleep, no IED  NEUROPSYCHOLOGY:  none

## 2024-04-09 NOTE — PHYSICAL EXAM
[FreeTextEntry1] : alert and oriented x 3, speech fluent, names easily, follows requests, good recall for recent and remote events. EOM full without sustained nystagmus, PERRL, face symmetrical, no dysarthria Motor - full strength in all extremities. normal rapid-alternating movements. Sensory - intact LT bilaterally Coord - no tremor, ataxia Gait - stands without difficulty, normal gait.

## 2024-04-09 NOTE — ASSESSMENT
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 Santo) and DVAs who presented for followup for focal epilepsy characterized by focal aware and impaired awareness sz's (unclear if progresses to true BTC sz's). Personally reviewed all images, labs, EEG and other studies.  Continues to have refractory FA sz's. developed transient localized rash briefly on  mg daily - likely coincidence. Pt not interested in surgical options unless sz freedom guaranteed. All questions and concerns answered and addressed in detail to pt and 's complete satisfaction. Patient and her  verbalized understanding and agreed to plan.  - increase CNB 200mg QHS - continue LEV ER 1000mg BID - decrease LCM 100mg BID - cont CLZ ODT 1-2mg prn aura - cont folic acid 1mg daily - pt does not drive - f/u in 6-8 wks - plan to maximize cenobamate -- may leave lacosamide 100 q12 and next reduce BRV.  I have spent 30 minutes or longer reviewing patient data or discussing with the patient  the cause of seizures or seizure-like events and comorbid conditions, assessing the risk of recurrence, educating the patient or family to recognize seizures, discussing possible treatment options for seizures and comorbid conditions and documenting encounter and plan. More than 50% of time spent counseling and educating patient about epilepsy including safety issues, AED side effects and interactions, alcohol consumption, sleep deprivation, risks and driving privileges associated with the [facility]. Greater than 50% of the encounter time was spent on counseling and coordination of care for reviewing records in [employer], discussion with patient regarding plan.

## 2024-04-12 RX ORDER — BRIVARACETAM 75 MG/1
75 TABLET, FILM COATED ORAL
Qty: 120 | Refills: 2 | Status: ACTIVE | COMMUNITY
Start: 2023-01-19 | End: 1900-01-01

## 2024-04-12 RX ORDER — CLONAZEPAM 1 MG/1
1 TABLET ORAL
Qty: 10 | Refills: 0 | Status: ACTIVE | COMMUNITY
Start: 2024-04-12 | End: 1900-01-01

## 2024-07-30 ENCOUNTER — APPOINTMENT (OUTPATIENT)
Dept: NEUROLOGY | Facility: CLINIC | Age: 44
End: 2024-07-30

## 2024-08-18 ENCOUNTER — NON-APPOINTMENT (OUTPATIENT)
Age: 44
End: 2024-08-18

## 2024-09-16 ENCOUNTER — NON-APPOINTMENT (OUTPATIENT)
Age: 44
End: 2024-09-16

## 2024-09-17 ENCOUNTER — APPOINTMENT (OUTPATIENT)
Dept: NEUROLOGY | Facility: CLINIC | Age: 44
End: 2024-09-17
Payer: COMMERCIAL

## 2024-09-17 VITALS
HEART RATE: 61 BPM | WEIGHT: 154 LBS | BODY MASS INDEX: 25.66 KG/M2 | DIASTOLIC BLOOD PRESSURE: 72 MMHG | SYSTOLIC BLOOD PRESSURE: 105 MMHG | HEIGHT: 65 IN

## 2024-09-17 DIAGNOSIS — G40.219 LOCALIZATION-RELATED (FOCAL) (PARTIAL) SYMPTOMATIC EPILEPSY AND EPILEPTIC SYNDROMES WITH COMPLEX PARTIAL SEIZURES, INTRACTABLE, W/OUT STATUS EPILEPTICUS: ICD-10-CM

## 2024-09-17 DIAGNOSIS — Q28.3 OTHER MALFORMATIONS OF CEREBRAL VESSELS: ICD-10-CM

## 2024-09-17 PROCEDURE — 99214 OFFICE O/P EST MOD 30 MIN: CPT

## 2024-09-17 PROCEDURE — G2211 COMPLEX E/M VISIT ADD ON: CPT | Mod: NC

## 2024-09-17 RX ORDER — CENOBAMATE 150 MG/1
150 TABLET, FILM COATED ORAL
Qty: 30 | Refills: 5 | Status: ACTIVE | COMMUNITY
Start: 2024-09-17 | End: 1900-01-01

## 2024-09-18 NOTE — DISCUSSION/SUMMARY
[Risks Associated with Driving/[place] Law] : As per my usual protocol, the patient was advised in regards to risks and driving privileges associated with the [place] Guidelines.  [Safety Recommendations] : The patient was advised in regards to the risk of seizures and general seizure safety recommendations including not to be bathing alone, climbing to high places and operating heavy machinery. [Compliance with Medications] : The importance of compliance with medications was reinforced. [Medication Side Effects] : High frequency and serious potential medication adverse effects were reviewed with the patient, including but not exclusive to psychiatric effects.  Information sheets on medication side effects were made available to the patient in our clinic.  The patient or advocate agrees to notify us for any concerns. [Teratogenicity] : Risks associated with AED use in pregnancy, teratogenicity and methods of contraception were discussed.  [Sleep Hygiene/Sleep Disruption Risks] : Sleep hygiene and the risks of sleep disruption were discussed.

## 2024-09-18 NOTE — ASSESSMENT
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 Santo) and DVAs who presented for followup for focal epilepsy characterized by focal aware and impaired awareness sz's (unclear if progresses to true BTC sz's). Pt and  report no interval seizures. on current ASMs regime as below.  I spent 10 min verifying meds as per pt many adjustments were done over the phone.     Plan: - Continue Xcopri  150mg  mg was sleepy on 200 mg                  LEV ER 1000 mg bid                  LCM 15\0mg qhs                   CLZ odt 1-2mg prn aura -  sz triggers discussed  -  pt does not drive  -  all questions answered -  f/u in 3-4 months with Dr Urena knows to call/send [facility] if  needed  x 32 min

## 2024-09-18 NOTE — HISTORY OF PRESENT ILLNESS
[FreeTextEntry1] : PCP: Dr. Benson  nickname: Nora Correa meds: Xcopri  150mg  mg was sleepy on 200 mg LEV ER 1000mg bid LCM 15 0mg qhs  CLZ odt 1-2mg prn aura  folic acid 1mg daily   *** 09/17/2024*** JERRY BUTCHER 45 yo RHF here with her  for a regular follow up. No interval sz, feels over all good not sleepy not tired,  her ASMs were changed, now on Xcopri  150mg QHS,  LEV ER 1000mg bid and LCM 15 0mg qhs  Last convulsion sz in 2023 not sure of the exact date. No new complains.   *** 04/0/92024  *** Ms. BUTCHER returns for follow up.  She reports that she continues having frequent seizure aura (vs complex partial seizure) postictally she c/o body pain and HA. She has not had a convulsion or motor seizure in more than a year.  She describes seizure as sometimes beginning with flashing light or pressure in ears. She and her  endorse that she is often tired and sleepy.  ASM  cenobamate 150 qD lacosamide 150 q12 levetiracetam ER 1000 q12 brivaracetam 75 q12  *** 04/07/2023  *** Pt today accompanied by . At last visit CNB was increased to 175 however developed rash (on wrists - not clearly related to cenobamate) and dose was decreased back to 150 mg and LEV was switched  mg BID. Continuous to experience infrequent (x1/ 3 months) "small seizures" characterized by evolving scotomas over the left visual field followed by headache / tachycardia and left hemibody paresthesias. They are overall satisfied with the current treatment regimen.  CURRENT ASM: CNB 150mg QHS - started 8/2021, rash on 200mg QHS LEV ER 1000mg BID - started 2015; level 65.1 on 4/5 on 1500mg BID LCM 150mg BID - started 7/2018; level 13.9 on 4/5 on 200mg BID CZP ODT 2mg prn - started 8/2018  === 9/21/22 === Pt today accompanied by . At the last visit, CNB increased from 100mg QHS to 150mg QHS for FA sz. Since the increase, no further seizures. Tolerating ASMs well. CURRENT ASM: CNB 150mg QHS - started 8/2021, rash on 200mg QHS; LEV ER 1000mg BID - started 2015; level 65.1 on 4/5 on 1500mg BID;  LCM 150mg BID - started 7/2018; level 13.9 on 4/5 on 200mg BID; CZP ODT 2mg prn - started 8/2018.  === 6/21/22 === Pt today accompanied by . One FA sz in March and another one in April. CURRENT ASM: CNB 100mg QHS - started 8/2021, rash on 200mg QHS; LEV ER 1000mg BID - started 2015; level 65.1 on 4/5 on 1500mg BID; LCM 150mg BID - started 7/2018; level 13.9 on 4/5 on 200mg BID; CZP ODT 2mg prn - started 8/2018.  === 1/24/22 === Pt today accompanied by . Developed blanchable and pruritic rash in trunk and arms a week after increasing CNB from 150mg QHS to 200mg QHS. No facial edema, angioedema, dyspnea, dysphagia. PCP prescribed topical cream and saw dermatologist. CNB temporarily stopped and LEV ER increased from 1000mg BID to 1500mg BID. CNB resumed ~1.5wks later at 100mg QHS. Rash resolved. No sz. CURRENT ASM: CNB 100mg QHS - started 8/2021; LEV ER 1500mg BID - started 2015; level 65.1 on 4/5 on 1500mg BID; LCM 150mg BID - started 7/2018; level 13.9 on 4/5 on 200mg BID; CZP ODT 2mg prn - started 8/2018.  === 10/18/21 === Pt today accompanied by . Tapered off of LTG. Seizures responding very well to CNB. Called 9/28/21 and reported fatigue on multiple AEDs. Instruction given to slowly lower LEV ER 1500mg BID to 100mg BID and LCM 200mg BID to 150mg BID. One mild focal aware sz as pt decreased the 2 AEDs, but no further sz since. CURRENT ASM: CNB 100mg QHS - started 8/2021; LEV ER 1000mg BID - started 2015; level 65.1 on 4/5 on 1500mg BID; LCM 150mg BID - started 7/2018; level 13.9 on 4/5 on 200mg BID; CZP ODT 2mg prn - started 8/2018.  === 8/17/21 === Pt today accompanied by . One focal sz since last visit. Reports hearing voices or feels like someone is in the room after the last sz. Pt has had this many years ago after a cluster of seizures. Symptoms self-resolved after a few months. Pt was amenable to surgery at the last visit, but after explaining to the pt that surgery is unable to guarantee sz freedom, pt no longer interested in surgical options. CURRENT AEDs: LEV ER 1500mg BID - started 2015; level 65.1 on 4/5; LCM 200mg BID - started 7/2018; level 13.9 on 4/5; LTG ER 100mg QHS - started 10/2020, lethargic on 150mg QHS, level 2.7 on 4/5; CZP ODT 2mg prn - started 8/2018.  === 5/18/21 === Pt today accompanied by . Did not tolerate LTG ER 150mg QHS due to daytime drowsiness. Self-decreased to IR 100mg QHS. No sz since last visit. Had change of mind about surgery; amenable if needed. Got her 2 doses of COVID-19 vaccine. CURRENT AEDs: LEV ER 1500mg BID - started 2015; level 65.1 on 4/5; LCM 200mg BID - started 7/2018; level 13.9 on 4/5; LTG IR 100mg QHS - started 10/2020, lethargic on 150mg QHS, level 2.7 on 4/5; CZP ODT 2mg prn - started 8/2018.  === 4/5/21 === Pt today accompanied by . Did not have any sz on LTG 100mg BID, but very sleepy. Therefore, self-decreased LTG to 100mg daily. Had 1 mild focal aware sz on 3/2, but a bigger focal aware sz on 3/25 with visual aura and severe nausea. CURRENT AEDs: LEV ER 1500mg BID - started 2015; level 51.4 on 8/13/20; LCM 200mg BID - started 7/2018; level 15.6 on 8/13/20; LTG 100mg daily - started 10/2020, very sleepy on 100mg BID; CZP ODT 1mg tabs prn - started 8/2018.  === 1/5/21 === Pt today accompanied by . At the last visit, started on LTG; tolerating well. Had one aura on 11/26, while on a low dose of LTG at 50mg BID, but no further sz since. Reports isolated right sided tinnitus without any other sx's (not habitual aura). Cancelled elective EMU due to concern for COVID. Saw Dr. Santo in Nov, who ordered repeat MRI w/wo 12/9, demonstrating stable right panhemispheric cav mal without significant change from prior images. CURRENT AEDs: LEV ER 1500mg BID - started 2015; level 51.4 on 8/13/20; LCM 200mg BID - started 7/2018; level 15.6 on 8/13/20; LTG 100mg BID - started 10/2020; CZP ODT 1mg tabs prn - started 8/2018.  === 10/9/20 === Pt today accompanied by . Did not show up for EMU due to family emergency. Tried CLB at 0.25mg, but remains very sleepy. Had one focal aware sz since last visit. CURRENT AEDs: LEV ER 1500mg BID - started 2015; level 51.4 on 8/13/20; LCM 200mg BID - started 7/2018; level 15.6 on 8/13/20; CZP ODT 1mg tabs prn - started 8/2018.  === 9/10/20 === No seizure activity noted since before last visit on 8/13/20. She reports that she had to stop taking the clobazam she was prescribed at her last visit due to the excessive drowsiness it caused. Tired on 5mg qHS; intolerable at 10mg qHS. She reports feeling well today and that her sleep has improved. Pending elective EMU next week. CURRENT AEDs: LEV ER 1500mg BID, LCM 200mg BID, CZP ODT 1mg tabs prn - started 8/2018.  === 8/13/20 === Increased LEV from 1000mg BID to 1500mg BID at the last visit. Had two focal seizures, aborted with CZP. Most breakthrough seizures occur around menses.  === 6/2/20 === Had a very small aura early 4/2020, aborted with CLZ x2. LCM 150mg BID increased to 200mg BID by Dr. Navarro on 5/5. No further sz. Pt and  expressed that they are no longer actively trying to conceive. CURRENT AEDs: LEV ER 1000mg BID - started 2015; LCM 200mg BID - started 7/2018; CLZ ODT 1mg tabs prn - started 8/2018.  === 1/8/20 === Pt today is accompanied by her . Since the last visit, has had two visual aura (seeing stars) around menstrual cycle, aborted with CZP. Sz threshold lowered by sleep deprivation. Hasn't gotten blood level of AEDs as instructed at the last visit. Still taking LEV IR and not ER because pt does not want to toss the remaining IR tablets; has a month of supply left. Saw Dr. Santo. MRI 11/27/19 stable compared to 12/8/18. No evidence for edema or hemorrhage. Repeat MRI in a yr. CURRENT AEDs: LEV IR 1000mg BID - started 2015; LCM 100mg BID - started 7/2018; CLZ ODT 1mg tabs prn - started 8/2018.  === 11/6/19 === Pt today is accompanied by her . At the last visit, increased LCM from 50mg BID to 100mg BID, which pt did. She was asked to change LEV IR 1gm BID to ER 1gm BID, which pt did not do. Hasn't had any sz since last visit. Saw Dr. Santo; awaiting MRI brain w/wo. CURRENT AEDs: LEV IR 1000mg BID - started 2015; LCM 100mg BID - started 7/2018; CLZ ODT 1mg tabs prn - started 8/2018.  === 9/11/19 === Since last visit, pt has had 4 seizures that started with buzzing noise, visual aura in L eye, nausea and tachycardia. All of which were aborted with CZP ODT. CURRENT AEDs: LEV 1000mg BID - started 2015 LCM 50mg BID - started 7/2018; CLZ ODT 1mg tabs prn - started 8/2018.  === 6/3/19 === Pt today is accompanied by her . At the last visit, planned to taper off LCM 50mg BID and increase LEV from 500mg BID to 2000mg BID. Three to four days after coming off of LCM, pt had a breakthrough seizure, needed CZP ODT 1mg x2. Pt now on LCM 50mg BID and LEV 1000mg BID. No further sz on this duo-therapy. CURRENT AEDs: LEV 1000mg BID - started 2015, LCM 50mg BID - started 7/2018, CLZ ODT 1mg tabs prn - started 8/2018.  === 2/19/19 === Pt today is accompanied by her . At the last visit, plan was to cross titrate LEV 500mg BID and LCM 50mg BID to LEV 1gm BID. On 11/13, pt was on LEV 1gm BID and LCM 50mg qHS when she had a typical aura. Pt took CLZ ODT. On 12/24, pt was only on LEV 1gm BID and again had another aura, but this time was very mild. She again took CLZ ODT and called the on-call epilepsy fellow, who instructed pt to go back to LEV 500mg BID and LCM 50mg BID.   Pt also saw Dr. 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 Offered cerebral angiography, but pt said she had it done in [place]. She will be bringing the imaging to Dr. Santo once she obtains a copy. Will f/u Dr. Santo and repeat image in a yr. CURRENT AEDs: LEV 500mg BID - started 2015; LCM 50mg BID - started 7/2018; CLZ ODT 0.5mg prn - started 8/2018.  === 11/8/18 === Pt today is accompanied by her . Clarified that she is really just only LEV 500mg BID (not 1gm BID) and LCM 50mg BID. She was sz free for several yrs on LEV 1gm BID. Sz recurred after LEV was lowered to 500mg BID. Has not had any further sz since last visit. CURRENT AEDs: LEV 500mg BID - started 2015; LCM 50mg BID - started 7/2018; CLZ ODT 0.5mg prn - started 8/2018.  **** 8/7/18 *** This is a 38 year-old RH Filipino female with no significant PMH who is self-referred to establish care for epilepsy. Pt today is accompanied by her . Pt was previously followed by neurologist Dr. Chuck Davis. Pt's  provided most of history since pt speaks limited English.  Onset of sz at age 25, during the 3rd month of pregnancy. Pt was in [facility] at the time. She was not started on AED, nor was she started on AED after her second sz at 9th month of pregnancy. After delivery, pt was started on CBZ. She was sz free on CBZ approximately from [id number]. Doctor in [facility] tapered her off of CBZ. When pt unfortunately sustained another sz, she was put back on CBZ.   Pt immigrated to the [place] around [place]. At the time, she was planning for second pregnancy, so CBZ was switched to LEV 500mg BID. Next seizure occurred on 8/2016, likely triggered by stress. LEV was increased 1gm BID. When pt was sz free for several years on this dosing, LEV was decreased back to 500mg BID. Unfortunately, with lowered dose of LEV, sz recurred on 7/14/18, while pt was in [place]. She was admitted to [facility]. Head imaging found innumerable lesions throughout R supratentorial hemisphere without focal enhancement, edema, or mass effect. Ddx: cav mal, infectious, less likely Sturge Nicolas or petechial hemorrhage. MRA H/N and cerebral angiogram were unremarkable. Pt and  were not aware of this finding. At discharge, LCM 50mg BID was added to LEV 500mg BID.  SEIZURE DESCRIPTION AND TYPE: Type #1 Severity: focal sz, both aware and impaired (unclear if progresses to true BL tonic clonic sz) Onset: 26yo Quality & associated signs/symptoms: Prolonged aura of nausea +/- vomiting, geographic shapes (eg. stars, bubbles) in her L visual field -> loss of awareness, head turning/jerking toward the L, tonic-clonic activity in L arm and leg -> postictal confusion, L Miguel Ángel's. Pt developed postictal psychosis (auditory hallucination, paranoia) once after a sz.  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 Patient was a product of normal pregnancy and delivery. No history of febrile seizure, TBI, CNS infection, or FH of seizures.  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 Other punctate cavernous malformations in the septum pellucidum, corpus callosum and infratentorial compartment. 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  MRI w/wo 11/27/19 ([facility]): Right cerebral hemisphere slightly smaller than the left. No change since 12/7/2018. 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   MRI w/wo 12/8/18 ([facility]): Multiple foci of T2 signal abnormalities associated with serpiginous vessels as described, which may represent cavernomatosis with associated DVA's. Lesions are unusually hemispheric as no lesions are seen within the left cerebral hemisphere. While findings may represent familial versus sporadic cavernomatosis, radiation induced cavernomatosis in the appropriate clinical setting may be considered. Other differential diagnoses of vascular malformations are not excluded. Findings are not consistent with neurosarcoidosis or lymphoproliferative abnormality.   CTH 7/15/18 ([place], [place]): Gyriform calcifications in the R temporooccipital regions. Multiple subcentimeter hyperdense lesions in the R cerebral hemisphere. No significant edema or midline shift.   MRI w/wo 7/14/18 ([facility], [place]): Innumerable lesions throughout R supratentorial hemisphere without evidence of edema or mass effect. Diffuse mild enhancement throughout R supratentorial hemisphere. Ddx: cav mal, infectious, less likely Sturge Nicolas or petechial hemorrhage.  MRA H/N w/wo 7/15/18 ([facility], [place]): normal  Cerebral angiogram 7/17/18 ([place], [place]): no aneurysm or AVM   NEUROPHYSIOLOGY: cvEEG 7/16 - 7/17/18 ([place], [place]): normal awake and asleep, no IED  NEUROPSYCHOLOGY:  none

## 2024-09-18 NOTE — HISTORY OF PRESENT ILLNESS
[FreeTextEntry1] : PCP: Dr. Benson  nickname: Nora Correa meds: Xcopri  150mg  mg was sleepy on 200 mg LEV ER 1000mg bid LCM 15 0mg qhs  CLZ odt 1-2mg prn aura  folic acid 1mg daily   *** 09/17/2024*** JERRY BUTCHER 45 yo RHF here with her  for a regular follow up. No interval sz, feels over all good not sleepy not tired,  her ASMs were changed, now on Xcopri  150mg QHS,  LEV ER 1000mg bid and LCM 15 0mg qhs  Last convulsion sz in 2023 not sure of the exact date. No new complains.   *** 04/0/92024  *** Ms. BUTCHER returns for follow up.  She reports that she continues having frequent seizure aura (vs complex partial seizure) postictally she c/o body pain and HA. She has not had a convulsion or motor seizure in more than a year.  She describes seizure as sometimes beginning with flashing light or pressure in ears. She and her  endorse that she is often tired and sleepy.  ASM  cenobamate 150 qD lacosamide 150 q12 levetiracetam ER 1000 q12 brivaracetam 75 q12  *** 04/07/2023  *** Pt today accompanied by . At last visit CNB was increased to 175 however developed rash (on wrists - not clearly related to cenobamate) and dose was decreased back to 150 mg and LEV was switched  mg BID. Continuous to experience infrequent (x1/ 3 months) "small seizures" characterized by evolving scotomas over the left visual field followed by headache / tachycardia and left hemibody paresthesias. They are overall satisfied with the current treatment regimen.  CURRENT ASM: CNB 150mg QHS - started 8/2021, rash on 200mg QHS LEV ER 1000mg BID - started 2015; level 65.1 on 4/5 on 1500mg BID LCM 150mg BID - started 7/2018; level 13.9 on 4/5 on 200mg BID CZP ODT 2mg prn - started 8/2018  === 9/21/22 === Pt today accompanied by . At the last visit, CNB increased from 100mg QHS to 150mg QHS for FA sz. Since the increase, no further seizures. Tolerating ASMs well. CURRENT ASM: CNB 150mg QHS - started 8/2021, rash on 200mg QHS; LEV ER 1000mg BID - started 2015; level 65.1 on 4/5 on 1500mg BID;  LCM 150mg BID - started 7/2018; level 13.9 on 4/5 on 200mg BID; CZP ODT 2mg prn - started 8/2018.  === 6/21/22 === Pt today accompanied by . One FA sz in March and another one in April. CURRENT ASM: CNB 100mg QHS - started 8/2021, rash on 200mg QHS; LEV ER 1000mg BID - started 2015; level 65.1 on 4/5 on 1500mg BID; LCM 150mg BID - started 7/2018; level 13.9 on 4/5 on 200mg BID; CZP ODT 2mg prn - started 8/2018.  === 1/24/22 === Pt today accompanied by . Developed blanchable and pruritic rash in trunk and arms a week after increasing CNB from 150mg QHS to 200mg QHS. No facial edema, angioedema, dyspnea, dysphagia. PCP prescribed topical cream and saw dermatologist. CNB temporarily stopped and LEV ER increased from 1000mg BID to 1500mg BID. CNB resumed ~1.5wks later at 100mg QHS. Rash resolved. No sz. CURRENT ASM: CNB 100mg QHS - started 8/2021; LEV ER 1500mg BID - started 2015; level 65.1 on 4/5 on 1500mg BID; LCM 150mg BID - started 7/2018; level 13.9 on 4/5 on 200mg BID; CZP ODT 2mg prn - started 8/2018.  === 10/18/21 === Pt today accompanied by . Tapered off of LTG. Seizures responding very well to CNB. Called 9/28/21 and reported fatigue on multiple AEDs. Instruction given to slowly lower LEV ER 1500mg BID to 100mg BID and LCM 200mg BID to 150mg BID. One mild focal aware sz as pt decreased the 2 AEDs, but no further sz since. CURRENT ASM: CNB 100mg QHS - started 8/2021; LEV ER 1000mg BID - started 2015; level 65.1 on 4/5 on 1500mg BID; LCM 150mg BID - started 7/2018; level 13.9 on 4/5 on 200mg BID; CZP ODT 2mg prn - started 8/2018.  === 8/17/21 === Pt today accompanied by . One focal sz since last visit. Reports hearing voices or feels like someone is in the room after the last sz. Pt has had this many years ago after a cluster of seizures. Symptoms self-resolved after a few months. Pt was amenable to surgery at the last visit, but after explaining to the pt that surgery is unable to guarantee sz freedom, pt no longer interested in surgical options. CURRENT AEDs: LEV ER 1500mg BID - started 2015; level 65.1 on 4/5; LCM 200mg BID - started 7/2018; level 13.9 on 4/5; LTG ER 100mg QHS - started 10/2020, lethargic on 150mg QHS, level 2.7 on 4/5; CZP ODT 2mg prn - started 8/2018.  === 5/18/21 === Pt today accompanied by . Did not tolerate LTG ER 150mg QHS due to daytime drowsiness. Self-decreased to IR 100mg QHS. No sz since last visit. Had change of mind about surgery; amenable if needed. Got her 2 doses of COVID-19 vaccine. CURRENT AEDs: LEV ER 1500mg BID - started 2015; level 65.1 on 4/5; LCM 200mg BID - started 7/2018; level 13.9 on 4/5; LTG IR 100mg QHS - started 10/2020, lethargic on 150mg QHS, level 2.7 on 4/5; CZP ODT 2mg prn - started 8/2018.  === 4/5/21 === Pt today accompanied by . Did not have any sz on LTG 100mg BID, but very sleepy. Therefore, self-decreased LTG to 100mg daily. Had 1 mild focal aware sz on 3/2, but a bigger focal aware sz on 3/25 with visual aura and severe nausea. CURRENT AEDs: LEV ER 1500mg BID - started 2015; level 51.4 on 8/13/20; LCM 200mg BID - started 7/2018; level 15.6 on 8/13/20; LTG 100mg daily - started 10/2020, very sleepy on 100mg BID; CZP ODT 1mg tabs prn - started 8/2018.  === 1/5/21 === Pt today accompanied by . At the last visit, started on LTG; tolerating well. Had one aura on 11/26, while on a low dose of LTG at 50mg BID, but no further sz since. Reports isolated right sided tinnitus without any other sx's (not habitual aura). Cancelled elective EMU due to concern for COVID. Saw Dr. Santo in Nov, who ordered repeat MRI w/wo 12/9, demonstrating stable right panhemispheric cav mal without significant change from prior images. CURRENT AEDs: LEV ER 1500mg BID - started 2015; level 51.4 on 8/13/20; LCM 200mg BID - started 7/2018; level 15.6 on 8/13/20; LTG 100mg BID - started 10/2020; CZP ODT 1mg tabs prn - started 8/2018.  === 10/9/20 === Pt today accompanied by . Did not show up for EMU due to family emergency. Tried CLB at 0.25mg, but remains very sleepy. Had one focal aware sz since last visit. CURRENT AEDs: LEV ER 1500mg BID - started 2015; level 51.4 on 8/13/20; LCM 200mg BID - started 7/2018; level 15.6 on 8/13/20; CZP ODT 1mg tabs prn - started 8/2018.  === 9/10/20 === No seizure activity noted since before last visit on 8/13/20. She reports that she had to stop taking the clobazam she was prescribed at her last visit due to the excessive drowsiness it caused. Tired on 5mg qHS; intolerable at 10mg qHS. She reports feeling well today and that her sleep has improved. Pending elective EMU next week. CURRENT AEDs: LEV ER 1500mg BID, LCM 200mg BID, CZP ODT 1mg tabs prn - started 8/2018.  === 8/13/20 === Increased LEV from 1000mg BID to 1500mg BID at the last visit. Had two focal seizures, aborted with CZP. Most breakthrough seizures occur around menses.  === 6/2/20 === Had a very small aura early 4/2020, aborted with CLZ x2. LCM 150mg BID increased to 200mg BID by Dr. Navarro on 5/5. No further sz. Pt and  expressed that they are no longer actively trying to conceive. CURRENT AEDs: LEV ER 1000mg BID - started 2015; LCM 200mg BID - started 7/2018; CLZ ODT 1mg tabs prn - started 8/2018.  === 1/8/20 === Pt today is accompanied by her . Since the last visit, has had two visual aura (seeing stars) around menstrual cycle, aborted with CZP. Sz threshold lowered by sleep deprivation. Hasn't gotten blood level of AEDs as instructed at the last visit. Still taking LEV IR and not ER because pt does not want to toss the remaining IR tablets; has a month of supply left. Saw Dr. Santo. MRI 11/27/19 stable compared to 12/8/18. No evidence for edema or hemorrhage. Repeat MRI in a yr. CURRENT AEDs: LEV IR 1000mg BID - started 2015; LCM 100mg BID - started 7/2018; CLZ ODT 1mg tabs prn - started 8/2018.  === 11/6/19 === Pt today is accompanied by her . At the last visit, increased LCM from 50mg BID to 100mg BID, which pt did. She was asked to change LEV IR 1gm BID to ER 1gm BID, which pt did not do. Hasn't had any sz since last visit. Saw Dr. Santo; awaiting MRI brain w/wo. CURRENT AEDs: LEV IR 1000mg BID - started 2015; LCM 100mg BID - started 7/2018; CLZ ODT 1mg tabs prn - started 8/2018.  === 9/11/19 === Since last visit, pt has had 4 seizures that started with buzzing noise, visual aura in L eye, nausea and tachycardia. All of which were aborted with CZP ODT. CURRENT AEDs: LEV 1000mg BID - started 2015 LCM 50mg BID - started 7/2018; CLZ ODT 1mg tabs prn - started 8/2018.  === 6/3/19 === Pt today is accompanied by her . At the last visit, planned to taper off LCM 50mg BID and increase LEV from 500mg BID to 2000mg BID. Three to four days after coming off of LCM, pt had a breakthrough seizure, needed CZP ODT 1mg x2. Pt now on LCM 50mg BID and LEV 1000mg BID. No further sz on this duo-therapy. CURRENT AEDs: LEV 1000mg BID - started 2015, LCM 50mg BID - started 7/2018, CLZ ODT 1mg tabs prn - started 8/2018.  === 2/19/19 === Pt today is accompanied by her . At the last visit, plan was to cross titrate LEV 500mg BID and LCM 50mg BID to LEV 1gm BID. On 11/13, pt was on LEV 1gm BID and LCM 50mg qHS when she had a typical aura. Pt took CLZ ODT. On 12/24, pt was only on LEV 1gm BID and again had another aura, but this time was very mild. She again took CLZ ODT and called the on-call epilepsy fellow, who instructed pt to go back to LEV 500mg BID and LCM 50mg BID.   Pt also saw Dr. 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 Offered cerebral angiography, but pt said she had it done in [place]. She will be bringing the imaging to Dr. Santo once she obtains a copy. Will f/u Dr. Santo and repeat image in a yr. CURRENT AEDs: LEV 500mg BID - started 2015; LCM 50mg BID - started 7/2018; CLZ ODT 0.5mg prn - started 8/2018.  === 11/8/18 === Pt today is accompanied by her . Clarified that she is really just only LEV 500mg BID (not 1gm BID) and LCM 50mg BID. She was sz free for several yrs on LEV 1gm BID. Sz recurred after LEV was lowered to 500mg BID. Has not had any further sz since last visit. CURRENT AEDs: LEV 500mg BID - started 2015; LCM 50mg BID - started 7/2018; CLZ ODT 0.5mg prn - started 8/2018.  **** 8/7/18 *** This is a 38 year-old RH [place] female with no significant PMH who is self-referred to establish care for epilepsy. Pt today is accompanied by her . Pt was previously followed by neurologist Dr. Chuck Davis. Pt's  provided most of history since pt speaks limited English.  Onset of sz at age 25, during the 3rd month of pregnancy. Pt was in [facility] at the time. She was not started on AED, nor was she started on AED after her second sz at 9th month of pregnancy. After delivery, pt was started on CBZ. She was sz free on CBZ approximately from [id number]. Doctor in [facility] tapered her off of CBZ. When pt unfortunately sustained another sz, she was put back on CBZ.   Pt immigrated to the [place] around [place]. At the time, she was planning for second pregnancy, so CBZ was switched to LEV 500mg BID. Next seizure occurred on 8/2016, likely triggered by stress. LEV was increased 1gm BID. When pt was sz free for several years on this dosing, LEV was decreased back to 500mg BID. Unfortunately, with lowered dose of LEV, sz recurred on 7/14/18, while pt was in [place]. She was admitted to [facility]. Head imaging found innumerable lesions throughout R supratentorial hemisphere without focal enhancement, edema, or mass effect. Ddx: cav mal, infectious, less likely Sturge Nicolas or petechial hemorrhage. MRA H/N and cerebral angiogram were unremarkable. Pt and  were not aware of this finding. At discharge, LCM 50mg BID was added to LEV 500mg BID.  SEIZURE DESCRIPTION AND TYPE: Type #1 Severity: focal sz, both aware and impaired (unclear if progresses to true BL tonic clonic sz) Onset: 24yo Quality & associated signs/symptoms: Prolonged aura of nausea +/- vomiting, geographic shapes (eg. stars, bubbles) in her L visual field -> loss of awareness, head turning/jerking toward the L, tonic-clonic activity in L arm and leg -> postictal confusion, L Miguel Ángel's. Pt developed postictal psychosis (auditory hallucination, paranoia) once after a sz.  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 Patient was a product of normal pregnancy and delivery. No history of febrile seizure, TBI, CNS infection, or FH of seizures.  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 Other punctate cavernous malformations in the septum pellucidum, corpus callosum and infratentorial compartment. 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  MRI w/wo 11/27/19 ([facility]): Right cerebral hemisphere slightly smaller than the left. No change since 12/7/2018. 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   MRI w/wo 12/8/18 ([facility]): Multiple foci of T2 signal abnormalities associated with serpiginous vessels as described, which may represent cavernomatosis with associated DVA's. Lesions are unusually hemispheric as no lesions are seen within the left cerebral hemisphere. While findings may represent familial versus sporadic cavernomatosis, radiation induced cavernomatosis in the appropriate clinical setting may be considered. Other differential diagnoses of vascular malformations are not excluded. Findings are not consistent with neurosarcoidosis or lymphoproliferative abnormality.   CTH 7/15/18 ([place], [place]): Gyriform calcifications in the R temporooccipital regions. Multiple subcentimeter hyperdense lesions in the R cerebral hemisphere. No significant edema or midline shift.   MRI w/wo 7/14/18 ([facility], [place]): Innumerable lesions throughout R supratentorial hemisphere without evidence of edema or mass effect. Diffuse mild enhancement throughout R supratentorial hemisphere. Ddx: cav mal, infectious, less likely Sturge Nicolas or petechial hemorrhage.  MRA H/N w/wo 7/15/18 ([facility], [place]): normal  Cerebral angiogram 7/17/18 ([place], [place]): no aneurysm or AVM   NEUROPHYSIOLOGY: cvEEG 7/16 - 7/17/18 ([place], [place]): normal awake and asleep, no IED  NEUROPSYCHOLOGY:  none

## 2024-09-18 NOTE — ASSESSMENT
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 Santo) and DVAs who presented for followup for focal epilepsy characterized by focal aware and impaired awareness sz's (unclear if progresses to true BTC sz's). Pt and  report no interval seizures. on current ASMs regime as below.  I spent 10 min verifying meds as per pt many adjustments were done over the phone.     Plan: - Continue Xcopri  150mg  mg was sleepy on 200 mg                  LEV ER 1000 mg bid                  LCM 15\0mg qhs                   CLZ odt 1-2mg prn aura -  sz triggers discussed  -  pt does not drive  -  all questions answered -  f/u in 3-4 months with Dr Urena knows to call/send [facility] if  needed  x 32 min

## 2025-02-28 ENCOUNTER — APPOINTMENT (OUTPATIENT)
Dept: NEUROLOGY | Facility: CLINIC | Age: 45
End: 2025-02-28

## 2025-04-30 ENCOUNTER — NON-APPOINTMENT (OUTPATIENT)
Age: 45
End: 2025-04-30

## 2025-05-02 ENCOUNTER — APPOINTMENT (OUTPATIENT)
Dept: NEUROLOGY | Facility: CLINIC | Age: 45
End: 2025-05-02